# Patient Record
Sex: MALE | Race: WHITE | Employment: UNEMPLOYED | URBAN - METROPOLITAN AREA
[De-identification: names, ages, dates, MRNs, and addresses within clinical notes are randomized per-mention and may not be internally consistent; named-entity substitution may affect disease eponyms.]

---

## 2017-05-09 ENCOUNTER — HOSPITAL ENCOUNTER (EMERGENCY)
Age: 32
Discharge: HOME | End: 2017-05-09
Attending: INTERNAL MEDICINE
Payer: MEDICAID

## 2017-05-09 VITALS
OXYGEN SATURATION: 95 % | WEIGHT: 225.25 LBS | BODY MASS INDEX: 32.25 KG/M2 | TEMPERATURE: 97.6 F | HEIGHT: 70 IN | DIASTOLIC BLOOD PRESSURE: 49 MMHG | HEART RATE: 92 BPM | RESPIRATION RATE: 17 BRPM | SYSTOLIC BLOOD PRESSURE: 100 MMHG

## 2017-05-09 DIAGNOSIS — G40.909 SEIZURE DISORDER (HCC): Primary | ICD-10-CM

## 2017-05-09 DIAGNOSIS — F19.10 DRUG ABUSE (HCC): ICD-10-CM

## 2017-05-09 LAB
ALBUMIN SERPL BCP-MCNC: 4 G/DL (ref 3.5–5)
ALBUMIN/GLOB SERPL: 1.3 {RATIO} (ref 1.1–2.2)
ALP SERPL-CCNC: 88 U/L (ref 45–117)
ALT SERPL-CCNC: 20 U/L (ref 12–78)
AMPHET UR QL SCN: NEGATIVE
ANION GAP BLD CALC-SCNC: 23 MMOL/L (ref 5–15)
AST SERPL W P-5'-P-CCNC: 22 U/L (ref 15–37)
BARBITURATES UR QL SCN: NEGATIVE
BASOPHILS # BLD AUTO: 0.1 K/UL (ref 0–0.1)
BASOPHILS # BLD: 1 % (ref 0–1)
BENZODIAZ UR QL: NEGATIVE
BILIRUB DIRECT SERPL-MCNC: 0.2 MG/DL (ref 0–0.2)
BILIRUB SERPL-MCNC: 0.9 MG/DL (ref 0.2–1)
BUN BLD-MCNC: 20 MG/DL (ref 9–20)
CA-I BLD-MCNC: 1.11 MMOL/L (ref 1.12–1.32)
CANNABINOIDS UR QL SCN: NEGATIVE
CHLORIDE BLD-SCNC: 102 MMOL/L (ref 98–107)
CO2 BLD-SCNC: 22 MMOL/L (ref 21–32)
COCAINE UR QL SCN: POSITIVE
CREAT BLD-MCNC: 1.1 MG/DL (ref 0.6–1.3)
DRUG SCRN COMMENT,DRGCM: ABNORMAL
EOSINOPHIL # BLD: 0.1 K/UL (ref 0–0.4)
EOSINOPHIL NFR BLD: 1 % (ref 0–7)
ERYTHROCYTE [DISTWIDTH] IN BLOOD BY AUTOMATED COUNT: 13.3 % (ref 11.5–14.5)
ETHANOL SERPL-MCNC: <10 MG/DL
GLOBULIN SER CALC-MCNC: 3.2 G/DL (ref 2–4)
GLUCOSE BLD-MCNC: 90 MG/DL (ref 65–100)
HCT VFR BLD AUTO: 41.7 % (ref 36.6–50.3)
HCT VFR BLD CALC: 46 % (ref 36.6–50.3)
HGB BLD-MCNC: 15.1 G/DL (ref 12.1–17)
HGB BLD-MCNC: 15.6 GM/DL (ref 12.1–17)
LYMPHOCYTES # BLD AUTO: 21 % (ref 12–49)
LYMPHOCYTES # BLD: 2.3 K/UL (ref 0.8–3.5)
MCH RBC QN AUTO: 32.6 PG (ref 26–34)
MCHC RBC AUTO-ENTMCNC: 36.2 G/DL (ref 30–36.5)
MCV RBC AUTO: 90.1 FL (ref 80–99)
METHADONE UR QL: NEGATIVE
MONOCYTES # BLD: 0.8 K/UL (ref 0–1)
MONOCYTES NFR BLD AUTO: 7 % (ref 5–13)
NEUTS SEG # BLD: 7.8 K/UL (ref 1.8–8)
NEUTS SEG NFR BLD AUTO: 70 % (ref 32–75)
OPIATES UR QL: NEGATIVE
PCP UR QL: NEGATIVE
PLATELET # BLD AUTO: 260 K/UL (ref 150–400)
POTASSIUM BLD-SCNC: 3.3 MMOL/L (ref 3.5–5.1)
PROT SERPL-MCNC: 7.2 G/DL (ref 6.4–8.2)
RBC # BLD AUTO: 4.63 M/UL (ref 4.1–5.7)
SERVICE CMNT-IMP: ABNORMAL
SODIUM BLD-SCNC: 143 MMOL/L (ref 136–145)
WBC # BLD AUTO: 11.1 K/UL (ref 4.1–11.1)

## 2017-05-09 PROCEDURE — 99285 EMERGENCY DEPT VISIT HI MDM: CPT

## 2017-05-09 PROCEDURE — 80076 HEPATIC FUNCTION PANEL: CPT | Performed by: INTERNAL MEDICINE

## 2017-05-09 PROCEDURE — 80183 DRUG SCRN QUANT OXCARBAZEPIN: CPT | Performed by: INTERNAL MEDICINE

## 2017-05-09 PROCEDURE — 80307 DRUG TEST PRSMV CHEM ANLYZR: CPT | Performed by: INTERNAL MEDICINE

## 2017-05-09 PROCEDURE — 93005 ELECTROCARDIOGRAM TRACING: CPT

## 2017-05-09 PROCEDURE — 74011250637 HC RX REV CODE- 250/637: Performed by: INTERNAL MEDICINE

## 2017-05-09 PROCEDURE — 85025 COMPLETE CBC W/AUTO DIFF WBC: CPT | Performed by: INTERNAL MEDICINE

## 2017-05-09 PROCEDURE — 80047 BASIC METABLC PNL IONIZED CA: CPT

## 2017-05-09 PROCEDURE — 36415 COLL VENOUS BLD VENIPUNCTURE: CPT | Performed by: INTERNAL MEDICINE

## 2017-05-09 RX ORDER — SODIUM CHLORIDE 0.9 % (FLUSH) 0.9 %
5-10 SYRINGE (ML) INJECTION AS NEEDED
Status: DISCONTINUED | OUTPATIENT
Start: 2017-05-09 | End: 2017-05-10 | Stop reason: HOSPADM

## 2017-05-09 RX ORDER — POTASSIUM CHLORIDE 750 MG/1
10 TABLET, FILM COATED, EXTENDED RELEASE ORAL
Status: COMPLETED | OUTPATIENT
Start: 2017-05-09 | End: 2017-05-09

## 2017-05-09 RX ORDER — SODIUM CHLORIDE 0.9 % (FLUSH) 0.9 %
5-10 SYRINGE (ML) INJECTION EVERY 8 HOURS
Status: DISCONTINUED | OUTPATIENT
Start: 2017-05-09 | End: 2017-05-10 | Stop reason: HOSPADM

## 2017-05-09 RX ADMIN — POTASSIUM CHLORIDE 10 MEQ: 750 TABLET, FILM COATED, EXTENDED RELEASE ORAL at 22:00

## 2017-05-09 NOTE — ED PROVIDER NOTES
HPI Comments: Venessa Cai is a 34 y.o. male with PMHx of epilepsy / arthritis / depression who presents via EMS to the ED for treatment of acute onset of seizure-like symptoms just PTA. 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 EMS personnel notes that the pt was not actively seizing when he was found by his co-worker or when EMS personnel arrived on scene. Per EMS personnel, pt was in a post-ictal state upon arrival although they note that his condition has improved since. EMS personnel also notes that he was found with seizure medications on his person. Pt is responsive upon arrival to the ED. He notes that he is compliant with his prescribed Trileptal for his seizures but denies taking any other ant-seizure medications. Pt denies any memory of the events that took place between working on the carpet and being woken up by his co-worker. He notes that it has been \"several months\" since he last had a seizure. Pt specifically denies biting his tongue or urinating during the incident, missed meals, lost sleep, or recent trauma that would have caused his symptoms. Social hx: + Tobacco use (0.25 ppd), - EtOH use, - Illicit drug use    PCP: Erick Aparicio III, DO    There are no other complaints, changes or physical findings at this time. The history is provided by the patient and the EMS personnel. No  was used.         Past Medical History:   Diagnosis Date    Anxiety disorder     Arthritis     Rheumatoid     Depression     Bipolar    Unspecified epilepsy without mention of intractable epilepsy ([facility] [address]     has epilepsy since age 6 mo; most recent 4mo ago       Past Surgical History:   Procedure Laterality Date    HX APPENDECTOMY      as child    HX ORTHOPAEDIC Right     Right hand fracture surgically repaired/Right foot surgery    HX ORTHOPAEDIC heel spurs         Family History:   Problem Relation Age of Onset    Cancer Mother      breast    No Known Problems Father        Social History     Social History    Marital status: SINGLE     Spouse name: N/A    Number of children: N/A    Years of education: N/A     Occupational History    Not on file. Social History Main Topics    Smoking status: Current Every Day Smoker     Packs/day: 0.25    Smokeless tobacco: Never Used    Alcohol use No    Drug use: Yes     Special: Marijuana      Comment: in past, but not currently    Sexual activity: Not on file     Other Topics Concern    Not on file     Social History Narrative         ALLERGIES: Review of patient's allergies indicates no known allergies. Review of Systems   Constitutional: Negative. Negative for chills and fever.        - loss of sleep  - missed meals   HENT: Negative.         + \"foaming at the mouth\"   Respiratory: Negative. Negative for cough and shortness of breath. Cardiovascular: Negative. Negative for chest pain. Gastrointestinal: Negative. Negative for abdominal pain, diarrhea, nausea and vomiting. Genitourinary: Negative. Negative for difficulty urinating. Skin: Negative. Negative for rash. Neurological: Positive for syncope.        + \"post-ictal state\"     Patient Vitals for the past 12 hrs:   Temp Pulse Resp BP SpO2   05/09/17 2100 - 92 17 100/49 95 %   05/09/17 2030 - 93 19 101/51 97 %   05/09/17 1943 97.6 °F (36.4 °C) (!) 106 16 125/70 93 %       Physical Exam   Constitutional: He is oriented to person, place, and time. He appears well-developed and well-nourished. No distress. HENT:   Head: Normocephalic and atraumatic. Eyes: EOM are normal. Pupils are equal, round, and reactive to light. Neck: Normal range of motion. Neck supple. Cardiovascular: Normal rate, normal heart sounds and intact distal pulses. Pulmonary/Chest: Effort normal and breath sounds normal. No respiratory distress. Abdominal: Soft. Bowel sounds are normal. He exhibits no distension. There is no tenderness. Musculoskeletal: Normal range of motion. He exhibits no edema or tenderness. Neurological: He is alert and oriented to person, place, and time. Skin: Skin is warm and dry. No rash noted. Psychiatric: He has a normal mood and affect. His behavior is normal.   Nursing note and vitals reviewed. MDM  Number of Diagnoses or Management Options  Drug abuse:   Seizure disorder [facility]):   Diagnosis management comments: DDx: seizure disorder, metabolic disorder       Amount and/or Complexity of Data Reviewed  Clinical lab tests: ordered and reviewed  Tests in the medicine section of CPT®: ordered and reviewed  Obtain [facility] someone other than the patient[facility]  Review and summarize past medical records: yes  Independent visualization of images, tracings, or specimens: yes      ED Course       Procedures    7:30 PM  I have reviewed medical records in the EHR, pertinent to patients present condition/presenting problems. In April of 2016, pt had a CT scan of his head performed that showed no intracranial disease. Pt had levels of Trileptal and Valproic acid performed at that time. Written by SAHRA Campbell, as dictated by Andreina Trinidad MD.    Progress Note:  7:39 PM  Discussed the risks of smoking and the benefits of smoking cessation as well as the long term sequelae of smoking with the pt. The patient verbalized their understanding. Written by SAHRA Campbell, as dictated by Andreina Trinidad MD.    EKG interpretation: (Preliminary)  7:41 PM  Rhythm: normal sinus rhythm; and regular . Rate (approx.): 92; Axis: normal; MO interval: normal; QRS interval: normal ; ST/T wave: normal; Other findings: normal.    Progress Note:  9:36 PM  Labs and imaging results were reviewed. It was determined that the pt tested positive for cocaine. These results were shared with the pt.    Written by SAHRA Bashiribtammy, as dictated by Maulik Ziegler MD.    LABORATORY TESTS:  Recent Results (from the past 12 hour(s))   EKG, 12 LEAD, INITIAL    Collection Time: 05/09/17  7:48 PM   Result Value Ref Range    Ventricular Rate 92 BPM    Atrial Rate 92 BPM    P-R Interval 144 ms    QRS Duration 92 ms    Q-T Interval 376 ms    QTC Calculation (Bezet) 464 ms    Calculated P Axis 27 degrees    Calculated R Axis 33 degrees    Calculated T Axis 24 degrees    Diagnosis       Normal sinus rhythm  Normal ECG  No previous ECGs available     CBC WITH AUTOMATED DIFF    Collection Time: 05/09/17  8:05 PM   Result Value Ref Range    WBC 11.1 4.1 - 11.1 K/uL    RBC 4.63 4.10 - 5.70 M/uL    HGB 15.1 12.1 - 17.0 g/dL    HCT 41.7 36.6 - 50.3 %    MCV 90.1 80.0 - 99.0 FL    MCH 32.6 26.0 - 34.0 PG    MCHC 36.2 30.0 - 36.5 g/dL    RDW 13.3 11.5 - 14.5 %    PLATELET 408 101 - 787 K/uL    NEUTROPHILS 70 32 - 75 %    LYMPHOCYTES 21 12 - 49 %    MONOCYTES 7 5 - 13 %    EOSINOPHILS 1 0 - 7 %    BASOPHILS 1 0 - 1 %    ABS. NEUTROPHILS 7.8 1.8 - 8.0 K/UL    ABS. LYMPHOCYTES 2.3 0.8 - 3.5 K/UL    ABS. MONOCYTES 0.8 0.0 - 1.0 K/UL    ABS. EOSINOPHILS 0.1 0.0 - 0.4 K/UL    ABS.  BASOPHILS 0.1 0.0 - 0.1 K/UL   DRUG SCREEN, URINE    Collection Time: 05/09/17  8:05 PM   Result Value Ref Range    AMPHETAMINE NEGATIVE  NEG      BARBITURATES NEGATIVE  NEG      BENZODIAZEPINE NEGATIVE  NEG      COCAINE POSITIVE (A) NEG      METHADONE NEGATIVE  NEG      OPIATES NEGATIVE  NEG      PCP(PHENCYCLIDINE) NEGATIVE  NEG      THC (TH-CANNABINOL) NEGATIVE  NEG      Drug screen comment (NOTE)    ETHYL ALCOHOL    Collection Time: 05/09/17  8:05 PM   Result Value Ref Range    ALCOHOL(ETHYL),SERUM <10 <10 MG/DL   HEPATIC FUNCTION PANEL    Collection Time: 05/09/17  8:05 PM   Result Value Ref Range    Protein, total 7.2 6.4 - 8.2 g/dL    Albumin 4.0 3.5 - 5.0 g/dL    Globulin 3.2 2.0 - 4.0 g/dL    A-G Ratio 1.3 1.1 - 2.2      Bilirubin, total 0.9 0.2 - 1.0 MG/DL    Bilirubin, direct 0.2 0.0 - 0.2 MG/DL    Alk. phosphatase 88 45 - 117 U/L    AST (SGOT) 22 15 - 37 U/L    ALT (SGPT) 20 12 - 78 U/L   POC CHEM8    Collection Time: 05/09/17  8:09 PM   Result Value Ref Range    Calcium, ionized (POC) 1.11 (L) 1.12 - 1.32 MMOL/L    Sodium (POC) 143 136 - 145 MMOL/L    Potassium (POC) 3.3 (L) 3.5 - 5.1 MMOL/L    Chloride (POC) 102 98 - 107 MMOL/L    CO2 (POC) 22 21 - 32 MMOL/L    Anion gap (POC) 23 (H) 5 - 15 mmol/L    Glucose (POC) 90 65 - 100 MG/DL    BUN (POC) 20 9 - 20 MG/DL    Creatinine (POC) 1.1 0.6 - 1.3 MG/DL    GFR-AA (POC) >60 >60 ml/min/1.73m2    GFR, non-AA (POC) >60 >60 ml/min/1.73m2    Hemoglobin (POC) 15.6 12.1 - 17.0 GM/DL    Hematocrit (POC) 46 36.6 - 50.3 %    Comment Comment Not Indicated. MEDICATIONS GIVEN:  Medications   sodium chloride (NS) flush 5-10 mL (not administered)   sodium chloride (NS) flush 5-10 mL (not administered)   potassium chloride SR (KLOR-CON 10) tablet 10 mEq (not administered)       IMPRESSION:  1. Seizure disorder ([facility] [address]        PLAN:  1. Current Discharge Medication List        2. Follow-up Information     None        Return to ED if worse     DISCHARGE NOTE  9:38 PM  The patient has been re-evaluated and is ready for discharge. Reviewed available results with patient. Counseled pt on diagnosis and care plan. Pt has expressed understanding, and all questions have been answered. Pt agrees with plan and agrees to F/U as recommended, or return to the ED if their sxs worsen. Discharge instructions have been provided and explained to the pt, along with reasons to return to the ED. This note is prepared by Meseret Sahu, acting as Scribe for Jaclyn Salguero MD.    Jaclyn Salguero MD: The scribe's documentation has been prepared under my direction and personally reviewed by me in its entirety. I confirm that the note above accurately reflects all work, treatment, procedures, and medical decision making performed by me.

## 2017-05-10 NOTE — ED NOTES
Discharge instructions were given to the patient by Felicity Jeffery RN. The patient left the Emergency Department ambulatory, alert and oriented and in no acute distress with 0 prescriptions. The patient was encouraged to call or return to the ED for worsening issues or problems and was encouraged to schedule a follow up appointment for continuing care. The patient verbalized understanding of discharge instructions and prescriptions, all questions were answered. The patient has no further concerns at this time.

## 2017-05-10 NOTE — DISCHARGE INSTRUCTIONS
Epilepsy: Care Instructions  Your Care Instructions  Epilepsy is a common condition that causes repeated seizures. The seizures are caused by bursts of electrical activity in the brain that aren't normal. Seizures may cause problems with muscle control, movement, speech, vision, or awareness. They can be scary. Epilepsy affects each person differently. Some people have only a few seizures. Others get them more often. If you know what triggers a seizure, you may be able to avoid having one. You can take medicines to control and reduce seizures. You and your doctor will need to find the right combination, schedule, and dose of medicine. This may take time and careful changes. Seizures may get worse and happen more often over time. Follow-up care is a key part of your treatment and safety. Be sure to make and go to all appointments, and call your doctor if you are having problems. It's also a good idea to know your test results and keep a list of the medicines you take. How can you care for yourself at home? · Be safe with medicines. Take your medicines exactly as prescribed. Call your doctor if you think you are having a problem with your medicine. · Make a treatment plan with your doctor. Be sure to follow your plan. · Try to identify and avoid things that may make you more likely to have a seizure. These may include:  ¨ Not getting enough sleep. ¨ Using drugs or alcohol. ¨ Being emotionally stressed. ¨ Skipping meals. · Keep a record of any seizures you have. Note the date, time of day, and any details about the seizure that you can remember. Your doctor can use this information to plan or adjust your medicine or other treatment. · Be sure that any doctor treating you for another condition knows that you have epilepsy. Each doctor should know what medicines you are taking, if any. · Wear a medical ID bracelet. You can buy this at most [employer].  If you have a seizure that leaves you unconscious or unable to speak for yourself, this bracelet will let those who are treating you know that you have epilepsy. · Talk to your doctor about whether it is safe for you to do certain activities, such as drive or swim. When should you call for help? Call 911 anytime you think you may need emergency care. For example, call if:  · A seizure does not stop as it normally does. · You have new symptoms such as:  ¨ Numbness, tingling, or weakness on one side of your body or face. ¨ Vision changes. ¨ Trouble speaking or thinking clearly. Call your doctor now or seek immediate medical care if:  · You have a fever. · You have a severe headache. Watch closely for changes in your health, and be sure to contact your doctor if:  · The normal pattern or features of your seizures change. Where can you learn more? Go to http://alvinREVENUE.com.info/. Yasmine Gaviria in the search box to learn more about \"Epilepsy: Care Instructions. \"  Current as of: October 14, 2016  Content Version: 11.2  © 7702-8352 [employer]. Care instructions adapted under license by [employer] (which disclaims liability or warranty for this information). If you have questions about a medical condition or this instruction, always ask your healthcare professional. [address] any warranty or liability for your use of this information. Learning About Drug Abuse  What is drug abuse? Drug abuse means using drugs in a way that harms you or causes you to harm others. Your doctor may call it substance use disorder or drug misuse. It's possible to misuse almost any type of drug, including illegal drugs, prescription drugs, and over-the-counter drugs. Could you have a problem? If there's a chance you may be misusing drugs, it's important to find out. So ask yourself a few questions. · Do you spend a lot of time thinking about your medicine or other drug--getting it and using it?  Has that taken the place of other things you used to enjoy? · Have you had problems with your family or friends, or at work, because of your use? · Do you use drugs even when you've told yourself you won't? Do you think you might have a problem? If you do, then you've just taken an important first step. Many people have overcome this problem. And most of them started by reaching out to others, like caring friends or family, their doctor, or a support group. How is drug abuse treated? If you think you may have a problem with drugs, talk to your doctor. You and your doctor can decide whether you have a problem and what type of treatment might help you. You may need to stay in a hospital at first, so that you can be treated for withdrawal symptoms. One of the goals of treatment is helping you get used to life without the drug. Counseling can help you prepare for people or situations that might tempt you to start using again. You can practice these skills through one-on-one counseling, family therapy, or group therapy. Therapy may be part of inpatient treatment, where you stay in a treatment center. Or it may be part of outpatient treatment, where you can fit your therapy around your job or other responsibilities. Another goal of treatment is getting ongoing support for your drug-free life. Many people find support by going to meetings like Narcotics Anonymous or SMART Recovery. This type of support can help you feel less alone and more motivated to stay drug-free. You might talk to your doctor or do an online search for local treatment programs. Or you might tell a friend or loved one that you need help. Follow-up care is a key part of your treatment and safety. Be sure to make and go to all appointments, and call your doctor if you are having problems. It's also a good idea to know your test results and keep a list of the medicines you take. Where can you learn more?   Go to http://marie.info/. Enter 432-969-5434 in the search box to learn more about \"Learning About Drug Abuse. \"  Current as of: November 3, 2016  Content Version: 11.2  © 8673-1662 [employer]. Care instructions adapted under license by [employer] (which disclaims liability or warranty for this information). If you have questions about a medical condition or this instruction, always ask your healthcare professional. [place] [address] any warranty or liability for your use of this information.

## 2017-05-10 NOTE — ED NOTES
Pt presents to ED via EMS complaining of witnessed seizure while at work Pt reports his last seizure was approx 2 months ago. Pt reports he does not know if he lost consciousness or hit his head during his seizure. Pt reports being compliant with all medications, has a bag full of loose pulls in a small bag. Family at bedside. Pt is alert and oriented x 4, RR even and unlabored, skin is warm and dry. Assesment completed and pt updated on plan of care. Emergency Department Nursing Plan of Care       The Nursing Plan of Care is developed from the Nursing assessment and Emergency Department Attending provider initial evaluation. The plan of care may be reviewed in the ED Provider note.     The Plan of Care was developed with the following considerations:   Patient / Family readiness to learn indicated by:verbalized understanding  Persons(s) to be included in education: patient and family  Barriers to Learning/Limitations:No    Signed     Elsy Ivan RN    5/9/2017   8:35 PM

## 2017-05-12 LAB — OXCARBAZEPINE SERPL-MCNC: 4 UG/ML (ref 10–35)

## 2017-05-14 LAB
ATRIAL RATE: 92 BPM
CALCULATED P AXIS, ECG09: 27 DEGREES
CALCULATED R AXIS, ECG10: 33 DEGREES
CALCULATED T AXIS, ECG11: 24 DEGREES
DIAGNOSIS, 93000: NORMAL
P-R INTERVAL, ECG05: 144 MS
Q-T INTERVAL, ECG07: 376 MS
QRS DURATION, ECG06: 92 MS
QTC CALCULATION (BEZET), ECG08: 464 MS
VENTRICULAR RATE, ECG03: 92 BPM

## 2017-06-12 ENCOUNTER — TELEPHONE (OUTPATIENT)
Dept: NEUROLOGY | Age: 32
End: 2017-06-12

## 2017-06-19 RX ORDER — NABUMETONE 500 MG/1
TABLET, FILM COATED ORAL
Qty: 60 TAB | Refills: 0 | Status: SHIPPED | OUTPATIENT
Start: 2017-06-19 | End: 2017-07-16 | Stop reason: SDUPTHER

## 2017-06-20 NOTE — TELEPHONE ENCOUNTER
Patient was given an appointment for October 31 and was told he could not drive for 6 months until November 9.

## 2017-07-16 RX ORDER — NABUMETONE 500 MG/1
TABLET, FILM COATED ORAL
Qty: 60 TAB | Refills: 0 | Status: SHIPPED | OUTPATIENT
Start: 2017-07-16 | End: 2017-08-14 | Stop reason: SDUPTHER

## 2017-08-14 RX ORDER — NABUMETONE 500 MG/1
TABLET, FILM COATED ORAL
Qty: 60 TAB | Refills: 0 | Status: SHIPPED | OUTPATIENT
Start: 2017-08-14 | End: 2017-09-11 | Stop reason: SDUPTHER

## 2017-09-12 RX ORDER — NABUMETONE 500 MG/1
TABLET, FILM COATED ORAL
Qty: 60 TAB | Refills: 0 | Status: SHIPPED | OUTPATIENT
Start: 2017-09-12 | End: 2017-10-11 | Stop reason: SDUPTHER

## 2017-10-12 RX ORDER — NABUMETONE 500 MG/1
TABLET, FILM COATED ORAL
Qty: 60 TAB | Refills: 0 | Status: SHIPPED | OUTPATIENT
Start: 2017-10-12 | End: 2018-01-03 | Stop reason: SDUPTHER

## 2017-10-12 RX ORDER — FLUOXETINE HYDROCHLORIDE 40 MG/1
CAPSULE ORAL
Qty: 30 CAP | Refills: 0 | Status: SHIPPED | OUTPATIENT
Start: 2017-10-12 | End: 2020-02-21 | Stop reason: ALTCHOICE

## 2017-10-31 ENCOUNTER — OFFICE VISIT (OUTPATIENT)
Dept: NEUROLOGY | Age: 32
End: 2017-10-31

## 2017-10-31 VITALS
HEIGHT: 70 IN | WEIGHT: 230 LBS | BODY MASS INDEX: 32.93 KG/M2 | HEART RATE: 74 BPM | DIASTOLIC BLOOD PRESSURE: 74 MMHG | OXYGEN SATURATION: 98 % | SYSTOLIC BLOOD PRESSURE: 100 MMHG

## 2017-10-31 DIAGNOSIS — Z51.81 THERAPEUTIC DRUG MONITORING: ICD-10-CM

## 2017-10-31 DIAGNOSIS — G40.209 COMPLEX PARTIAL SEIZURES WITH CONSCIOUSNESS IMPAIRED (HCC): Primary | ICD-10-CM

## 2017-10-31 NOTE — PATIENT INSTRUCTIONS
[address] [facility]   Statement to Patients  April 1, 2014      In an effort to ensure the large volume of patient prescription refills is processed in the most efficient and expeditious manner, we are asking our patients to assist us by calling your Pharmacy for all prescription refills, this will include also your  Mail Order Pharmacy. The pharmacy will contact our office electronically to continue the refill process. Please do not wait until the last minute to call your pharmacy. We need at least 48 hours (2days) to fill prescriptions. We also encourage you to call your pharmacy before going to  your prescription to make sure it is ready. With regard to controlled substance prescription refill requests (narcotic refills) that need to be picked up at our office, we ask your cooperation by providing us with at least 72 hours (3days) notice that you will need a refill. We will not refill narcotic prescription refill requests after 4:00pm on any weekday, Monday through Thursday, or after 2:00pm on Fridays, or on the weekends. We encourage everyone to explore another way of getting your prescription refill request processed using [employer], our patient web portal through our electronic medical record system. [employer]PLAY is an efficient and effective way to communicate your medication request directly to the office and  downloadable as an lacho on your smart phone . [employer]PLAY also features a review functionality that allows you to view your medication list as well as leave messages for your physician. Are you ready to get connected? If so please review the attatched instructions or speak to any of our staff to get you set up right away! Thank you so much for your cooperation. Should you have any questions please contact our Practice Administrator.     The Physicians and Staff,  [facility]          A Healthy Lifestyle: Care Instructions  Your Care Instructions    A healthy lifestyle can help you feel good, stay at a healthy weight, and have plenty of energy for both work and play. A healthy lifestyle is something you can share with your whole family. A healthy lifestyle also can lower your risk for serious health problems, such as high blood pressure, heart disease, and diabetes. You can follow a few steps listed below to improve your health and the health of your family. Follow-up care is a key part of your treatment and safety. Be sure to make and go to all appointments, and call your doctor if you are having problems. It's also a good idea to know your test results and keep a list of the medicines you take. How can you care for yourself at home? · Do not eat too much sugar, fat, or fast foods. You can still have dessert and treats now and then. The goal is moderation. · Start small to improve your eating habits. Pay attention to portion sizes, drink less juice and soda pop, and eat more fruits and vegetables. ¨ Eat a healthy amount of food. A 3-ounce serving of meat, for example, is about the size of a deck of cards. Fill the rest of your plate with vegetables and whole grains. ¨ Limit the amount of soda and sports drinks you have every day. Drink more water when you are thirsty. ¨ Eat at least 5 servings of fruits and vegetables every day. It may seem like a lot, but it is not hard to reach this goal. A serving or helping is 1 piece of fruit, 1 cup of vegetables, or 2 cups of leafy, raw vegetables. Have an apple or some carrot sticks as an afternoon snack instead of a candy bar. Try to have fruits and/or vegetables at every meal.  · Make exercise part of your daily routine. You may want to start with simple activities, such as walking, bicycling, or slow swimming. Try to be active 30 to 60 minutes every day. You do not need to do all 30 to 60 minutes all at once. For example, you can exercise 3 times a day for 10 or 20 minutes.  Moderate exercise is safe for most people, but it is always a good idea to talk to your doctor before starting an exercise program.  · Keep moving. [place] the lawn, work in the garden, or [employer]. Take the stairs instead of the elevator at work. · If you smoke, quit. People who smoke have an increased risk for heart attack, stroke, cancer, and other lung illnesses. Quitting is hard, but there are ways to boost your chance of quitting tobacco for good. ¨ Use nicotine gum, patches, or lozenges. ¨ Ask your doctor about stop-smoking programs and medicines. ¨ Keep trying. In addition to reducing your risk of diseases in the future, you will notice some benefits soon after you stop using tobacco. If you have shortness of breath or asthma symptoms, they will likely get better within a few weeks after you quit. · Limit how much alcohol you drink. Moderate amounts of alcohol (up to 2 drinks a day for men, 1 drink a day for women) are okay. But drinking too much can lead to liver problems, high blood pressure, and other health problems. Family health  If you have a family, there are many things you can do together to improve your health. · Eat meals together as a family as often as possible. · Eat healthy foods. This includes fruits, vegetables, lean meats and dairy, and whole grains. · Include your family in your fitness plan. Most people think of activities such as jogging or tennis as the way to fitness, but there are many ways you and your family can be more active. Anything that makes you breathe hard and gets your heart pumping is exercise. Here are some tips:  ¨ Walk to do errands or to take your child to school or the bus. ¨ Go for a family bike ride after dinner instead of watching TV. Where can you learn more? Go to http://alvin-lizette.info/. Enter Y471 in the search box to learn more about \"A Healthy Lifestyle: Care Instructions. \"  Current as of:  May 12, 2017  Content Version: 11.4  © 1532-1178 [employer]. Care instructions adapted under license by [employer] (which disclaims liability or warranty for this information). If you have questions about a medical condition or this instruction, always ask your healthcare professional. [place] [address] any warranty or liability for your use of this information.

## 2017-10-31 NOTE — MR AVS SNAPSHOT
Visit Information Date & Time Provider Department Dept. Phone Encounter #  
 10/31/2017 11:00 AM Tammy Velez NP Neurology Clinic at [facility] [phone] [id number] Upcoming Health Maintenance Date Due Pneumococcal 19-64 Medium Risk (1 of 1 - PPSV23) 4/11/2003 INFLUENZA AGE 9 TO ADULT 8/1/2017 DTaP/Tdap/Td series (2 - Td) 4/17/2026 Allergies as of 10/31/2017  Review Complete On: 10/31/2017 By: Juancho Barsher LPN No Known Allergies Current Immunizations  Never Reviewed Name Date Tdap 4/17/2016  1:57 AM  
  
 Not reviewed this visit You Were Diagnosed With   
  
 Codes Comments Complex partial seizures with consciousness impaired ([facility] [address]    -  Primary ICD-10-CM: K33.921 ICD-9-CM: 345.40 Therapeutic drug monitoring     ICD-10-CM: Z51.81 
ICD-9-CM: V58.83 Vitals BP Pulse Height(growth percentile) Weight(growth percentile) SpO2 BMI  
 100/74 74 5' 10\" (1.778 m) 230 lb (104.3 kg) 98% 33 kg/m2 Smoking Status Current Every Day Smoker Vitals History BMI and BSA Data Body Mass Index Body Surface Area  
 33 kg/m 2 2.27 m 2 Preferred Pharmacy Pharmacy Name Phone [facility] [address] [phone] Your Updated Medication List  
  
   
This list is accurate as of: 10/31/17 11:26 AM.  Always use your most recent med list.  
  
  
  
  
 B COMPLEX 1 tablet Generic drug:  b complex vitamins Take 1 Tab by mouth daily. FLUoxetine 40 mg capsule Commonly known as:  PROzac TAKE 1 CAPSULE BY MOUTH DAILY  
  
 nabumetone 500 mg tablet Commonly known as:  RELAFEN  
TAKE 1 TABLET BY MOUTH TWICE DAILY AS NEEDED FOR PAIN OXcarbaxepine 600 mg tablet Commonly known as:  TRILEPTAL  
TAKE 1 TABLET BY MOUTH TWICE DAILY  
  
 VITAMIN D3 1,000 unit Cap Generic drug:  cholecalciferol Take  by mouth. We Performed the Following OXCARBAZEPINE(TRILEPTAL) [id number] CPT(R)] Patient Instructions PRESCRIPTION REFILL POLICY [facility] Statement to Patients April 1, 2014 In an effort to ensure the large volume of patient prescription refills is processed in the most efficient and expeditious manner, we are asking our patients to assist us by calling your Pharmacy for all prescription refills, this will include also your  Mail Order Pharmacy. The pharmacy will contact our office electronically to continue the refill process. Please do not wait until the last minute to call your pharmacy. We need at least 48 hours (2days) to fill prescriptions. We also encourage you to call your pharmacy before going to  your prescription to make sure it is ready. With regard to controlled substance prescription refill requests (narcotic refills) that need to be picked up at our office, we ask your cooperation by providing us with at least 72 hours (3days) notice that you will need a refill. We will not refill narcotic prescription refill requests after 4:00pm on any weekday, Monday through Thursday, or after 2:00pm on Fridays, or on the weekends. We encourage everyone to explore another way of getting your prescription refill request processed using Mang?rKart, our patient web portal through our electronic medical record system. Mang?rKart is an efficient and effective way to communicate your medication request directly to the office and  downloadable as an lacho on your smart phone . Mang?rKart also features a review functionality that allows you to view your medication list as well as leave messages for your physician. Are you ready to get connected? If so please review the attatched instructions or speak to any of our staff to get you set up right away! Thank you so much for your cooperation.  Should you have any questions please contact our Practice Administrator. The Physicians and Staff,  [address] [place] [address] Neurology Clinic A Healthy Lifestyle: Care Instructions Your Care Instructions A healthy lifestyle can help you feel good, stay at a healthy weight, and have plenty of energy for both work and play. A healthy lifestyle is something you can share with your whole family. A healthy lifestyle also can lower your risk for serious health problems, such as high blood pressure, heart disease, and diabetes. You can follow a few steps listed below to improve your health and the health of your family. Follow-up care is a key part of your treatment and safety. Be sure to make and go to all appointments, and call your doctor if you are having problems. It's also a good idea to know your test results and keep a list of the medicines you take. How can you care for yourself at home? · Do not eat too much sugar, fat, or fast foods. You can still have dessert and treats now and then. The goal is moderation. · Start small to improve your eating habits. Pay attention to portion sizes, drink less juice and soda pop, and eat more fruits and vegetables. ¨ Eat a healthy amount of food. A 3-ounce serving of meat, for example, is about the size of a deck of cards. Fill the rest of your plate with vegetables and whole grains. ¨ Limit the amount of soda and sports drinks you have every day. Drink more water when you are thirsty. ¨ Eat at least 5 servings of fruits and vegetables every day. It may seem like a lot, but it is not hard to reach this goal. A serving or helping is 1 piece of fruit, 1 cup of vegetables, or 2 cups of leafy, raw vegetables. Have an apple or some carrot sticks as an afternoon snack instead of a candy bar. Try to have fruits and/or vegetables at every meal. 
· Make exercise part of your daily routine. You may want to start with simple activities, such as walking, bicycling, or slow swimming.  Try to be active 30 to 60 minutes every day. You do not need to do all 30 to 60 minutes all at once. For example, you can exercise 3 times a day for 10 or 20 minutes. Moderate exercise is safe for most people, but it is always a good idea to talk to your doctor before starting an exercise program. 
· Keep moving. Isael Sang the lawn, work in the garden, or [employer]. Take the stairs instead of the elevator at work. · If you smoke, quit. People who smoke have an increased risk for heart attack, stroke, cancer, and other lung illnesses. Quitting is hard, but there are ways to boost your chance of quitting tobacco for good. ¨ Use nicotine gum, patches, or lozenges. ¨ Ask your doctor about stop-smoking programs and medicines. ¨ Keep trying. In addition to reducing your risk of diseases in the future, you will notice some benefits soon after you stop using tobacco. If you have shortness of breath or asthma symptoms, they will likely get better within a few weeks after you quit. · Limit how much alcohol you drink. Moderate amounts of alcohol (up to 2 drinks a day for men, 1 drink a day for women) are okay. But drinking too much can lead to liver problems, high blood pressure, and other health problems. Family health If you have a family, there are many things you can do together to improve your health. · Eat meals together as a family as often as possible. · Eat healthy foods. This includes fruits, vegetables, lean meats and dairy, and whole grains. · Include your family in your fitness plan. Most people think of activities such as jogging or tennis as the way to fitness, but there are many ways you and your family can be more active. Anything that makes you breathe hard and gets your heart pumping is exercise. Here are some tips: 
¨ Walk to do errands or to take your child to school or the bus. ¨ Go for a family bike ride after dinner instead of watching TV. Where can you learn more? Go to http://alvin-lizette.info/. Enter J449 in the search box to learn more about \"A Healthy Lifestyle: Care Instructions. \" Current as of: May 12, 2017 Content Version: 11.4 © 3470-4824 Healthwise, [place]. Care instructions adapted under license by [employer] (which disclaims liability or warranty for this information). If you have questions about a medical condition or this instruction, always ask your healthcare professional. [address] any warranty or liability for your use of this information. Introducing [facility]! Robe Lema introduces [facility] patient portal. Now you can access parts of your medical record, email your doctor's office, and request medication refills online. 1. In your internet browser, go to [email or web address] 2. Click on the First Time User? Click Here link in the Sign In box. You will see the New Member Sign Up page. 3. Enter your [facility] Access Code exactly as it appears below. You will not need to use this code after youve completed the sign-up process. If you do not sign up before the expiration date, you must request a new code. · [facility] Access Code: [email or web address] Expires: 1/29/2018 11:25 AM 
 
4. Enter the last four digits of your Social Security Number (xxxx) and Date of Birth (mm/dd/yyyy) as indicated and click Submit. You will be taken to the next sign-up page. 5. Create a [facility] ID. This will be your [facility] login ID and cannot be changed, so think of one that is secure and easy to remember. 6. Create a [facility] password. You can change your password at any time. 7. Enter your Password Reset Question and Answer. This can be used at a later time if you forget your password. 8. Enter your e-mail address. You will receive e-mail notification when new information is available in [address] 9. Click Sign Up.  You can now view and download portions of your medical record. 10. Click the Download Summary menu link to download a portable copy of your medical information. If you have questions, please visit the Frequently Asked Questions section of the Consumer[employer] website. Remember, ConsumerBell is NOT to be used for urgent needs. For medical emergencies, dial 911. Now available from your iPhone and Android! Please provide this summary of care documentation to your next provider. Your primary care clinician is listed as Nandini Mckay If you have any questions after today's visit, please call [phone].

## 2017-10-31 NOTE — PROGRESS NOTES
Date:           2017    Name:  Akiko Davila  :  1985  MRN:  [id number]     PCP:  Aleksandar Boyer DO    Chief Complaint   Patient presents with    Follow-up    Seizure     Last seizure was Saturday. HISTORY OF PRESENT ILLNESS:  Ludmila Young is a 34 y.o., male who presents today for follow up for seizures. He was on Depakote the past, both for seizures and for bipolar disorder, he did not tolerate that and decided to switch back to Trileptal in May 2016. He has had relatively good control on Trileptal, but has had a few breakthrough seizures. Last seizure was less than a week ago, before that he thinks it may have been in May of this year. He reports that he has been taking medication consistently, is not missing doses of his Trileptal. He was not sick when his breakthrough seizure happened. His urine drug screen was positive for cocaine when he had a seizure in May, negative for alcohol. He has not been sleeping well, he tosses and turns which is not new for him. He is not following with psychiatry consistently but knows that he needs to. His seizures started at 5months of age, no known cause. No h/o head injury or meningitis. He does not drink alcohol, no drug use currently per his report. [id number] recap  Ludmila Young is a 27 y.o., male who presents today for follow up for seizures. He was previously on Depakote for seizures, chose to switch back to Trileptal because of side effects of Depakote in May of this year. He initially went on Depakote for mood stabilization, but did not feel that helped with that. Last reported seizure was in , shortly after changing medications. He was eating, and lost consciousness. Does not remember anything until he was in the emergency room. He is now taking 600 mg bid of trileptal, and hasn't noticed any side effects from that.  He is not currently seeing anyone for his bipolar disorder, feels that it is stable. Takes prozac only for that. Has seen a therapist in the past, not a psychiatrist. Plans to re-establish care with a therapist, does not think that he needs a psychiatrist.  Codi Lobe that he is driving. Knows that he is not supposed to be because he had a seizure in June. Only other complaint is arthritis, for which he takes nambumetone. Current Outpatient Prescriptions   Medication Sig    OXcarbaxepine (TRILEPTAL) 600 mg tablet TAKE 1 TABLET BY MOUTH TWICE DAILY    nabumetone (RELAFEN) 500 mg tablet TAKE 1 TABLET BY MOUTH TWICE DAILY AS NEEDED FOR PAIN    FLUoxetine (PROZAC) 40 mg capsule TAKE 1 CAPSULE BY MOUTH DAILY    Cholecalciferol, Vitamin D3, (VITAMIN D3) 1,000 unit cap Take  by mouth.  b complex vitamins (B COMPLEX 1) tablet Take 1 Tab by mouth daily. No current facility-administered medications for this visit. No Known Allergies  Past Medical History:   Diagnosis Date    Anxiety disorder     Arthritis     Rheumatoid     Depression     Bipolar    Unspecified epilepsy without mention of intractable epilepsy     has epilepsy since age 7 mo; most recent 4mo ago     Past Surgical History:   Procedure Laterality Date    HX APPENDECTOMY      as child    HX ORTHOPAEDIC Right     Right hand fracture surgically repaired/Right foot surgery    HX ORTHOPAEDIC      heel spurs     Social History     Social History    Marital status: SINGLE     Spouse name: N/A    Number of children: N/A    Years of education: N/A     Occupational History    Not on file.      Social History Main Topics    Smoking status: Current Every Day Smoker     Packs/day: 1.00    Smokeless tobacco: Never Used    Alcohol use No    Drug use: Yes     Special: Marijuana      Comment: in past, but not currently    Sexual activity: Not on file     Other Topics Concern    Not on file     Social History Narrative     Family History   Problem Relation Age of Onset    Cancer Mother      breast    No Known Problems Father          PHYSICAL EXAMINATION:    Visit Vitals    /74    Pulse 74    Ht 5' 10\" (1.778 m)    Wt 230 lb (104.3 kg)    SpO2 98%    BMI 33 kg/m2     General:  Well defined, nourished, and groomed individual in no acute distress. Neck: Supple, nontender, no bruits, no pain with resistance to active range of motion. Heart: Regular rate and rhythm, no murmurs, rub, or gallop. Normal S1S2. Lungs:  Clear to auscultation bilaterally with equal chest expansion, no cough, no wheeze  Musculoskeletal:  Extremities revealed no edema and had full range of motion of joints. Psych:  Good mood and bright affect    NEUROLOGICAL EXAMINATION:     Mental Status:   Alert and oriented to person, place, and time with recent and remote memory intact. Attention span and concentration are normal. Speech is fluent with a full fund of knowledge. Cranial Nerves:    II, III, IV, VI:  Visual acuity grossly intact. Extra-ocular movements are full and fluid. No ptosis or nystagmus. V-XII: Hearing is grossly intact. Facial features are symmetric, with normal sensation and strength. The palate rises symmetrically and the tongue protrudes midline. Sternocleidomastoids 5/5. Motor Examination: Normal tone, bulk, and strength, 5/5 muscle strength throughout. Coordination:  Finger to nose testing was normal.   No resting or intention tremor  Gait and Station:  Steady while walking. Normal arm swing. No pronator drift. No muscle wasting or fasciculations noted. ASSESSMENT AND PLAN    ICD-10-CM ICD-9-CM    1. Complex partial seizures with consciousness impaired ([facility]) G40.209 345.40 OXCARBAZEPINE(TRILEPTAL)   2. Therapeutic drug monitoring Z51.81 V58.83 OXCARBAZEPINE(TRILEPTAL)     23-year-old male seen in follow-up for seizures. Recent breakthrough seizure after a few months of good control on Trileptal.  He feels this was unprovoked, no illness or alcohol use.   last breakthrough seizure in May was likely due to cocaine or noncompliance, his urine drug screen was positive and his oxcarbazepine level was low. 1.  Continue Trileptal 600 mg twice daily  2. We will check Trileptal level to see if we can increase the drug, if not will need to add a second medication as he had an unprovoked breakthrough seizure  3. Patient reminded that he cannot drive for 6 months after having a seizure    Follow-up in 6 weeks, call sooner with concerns    Lorena Longoria NP    This note was created using voice recognition software. Despite editing, there may be syntax errors.

## 2017-10-31 NOTE — LETTER
NOTIFICATION RETURN TO WORK / SCHOOL 
 
10/31/2017 11:28 AM 
 
Mr. Darryle Brazil [address] [facility] [id number] To Whom It May Concern: 
 
Darryle Brazil is currently under the care of [address] [place] [address]. He was unable to work on 10/29/2017 due to a medical issue. He will return to work/school on: 10/31/2017 If there are questions or concerns please have the patient contact our office. Sincerely, Sia العلي NP

## 2017-11-09 DIAGNOSIS — G40.209 COMPLEX PARTIAL SEIZURES WITH CONSCIOUSNESS IMPAIRED (HCC): Primary | ICD-10-CM

## 2017-11-09 LAB — OXCARBAZEPINE SERPL-MCNC: 11 UG/ML (ref 10–35)

## 2017-11-09 RX ORDER — OXCARBAZEPINE 150 MG/1
150 TABLET, FILM COATED ORAL 2 TIMES DAILY
Qty: 60 TAB | Refills: 5 | Status: SHIPPED | OUTPATIENT
Start: 2017-11-09 | End: 2020-02-21 | Stop reason: ALTCHOICE

## 2017-11-09 NOTE — PROGRESS NOTES
Please call patient and let him know that I sent in an additional Trileptal (oxcarbazepine) prescription.   He will take one 150 mg tablet twice a day with his 600 mg tablet

## 2017-11-10 ENCOUNTER — TELEPHONE (OUTPATIENT)
Dept: NEUROLOGY | Age: 32
End: 2017-11-10

## 2018-01-04 RX ORDER — NABUMETONE 500 MG/1
TABLET, FILM COATED ORAL
Qty: 60 TAB | Refills: 0 | Status: SHIPPED | OUTPATIENT
Start: 2018-01-04 | End: 2020-02-21 | Stop reason: SDUPTHER

## 2018-01-19 ENCOUNTER — OFFICE VISIT (OUTPATIENT)
Dept: NEUROLOGY | Age: 33
End: 2018-01-19

## 2018-01-19 VITALS
HEIGHT: 70 IN | BODY MASS INDEX: 32.64 KG/M2 | OXYGEN SATURATION: 98 % | WEIGHT: 228 LBS | SYSTOLIC BLOOD PRESSURE: 100 MMHG | HEART RATE: 89 BPM | DIASTOLIC BLOOD PRESSURE: 70 MMHG

## 2018-01-19 DIAGNOSIS — F41.9 ANXIETY: ICD-10-CM

## 2018-01-19 DIAGNOSIS — F60.9 PERSONALITY DISORDER (HCC): ICD-10-CM

## 2018-01-19 DIAGNOSIS — Z51.81 THERAPEUTIC DRUG MONITORING: ICD-10-CM

## 2018-01-19 DIAGNOSIS — G40.209 COMPLEX PARTIAL SEIZURES WITH CONSCIOUSNESS IMPAIRED (HCC): Primary | ICD-10-CM

## 2018-01-19 DIAGNOSIS — Z79.899 LONG-TERM USE OF HIGH-RISK MEDICATION: ICD-10-CM

## 2018-01-19 NOTE — PATIENT INSTRUCTIONS
[facility]   Statement to Patients  April 1, 2014      In an effort to ensure the large volume of patient prescription refills is processed in the most efficient and expeditious manner, we are asking our patients to assist us by calling your Pharmacy for all prescription refills, this will include also your  Mail Order Pharmacy. The pharmacy will contact our office electronically to continue the refill process. Please do not wait until the last minute to call your pharmacy. We need at least 48 hours (2days) to fill prescriptions. We also encourage you to call your pharmacy before going to  your prescription to make sure it is ready. With regard to controlled substance prescription refill requests (narcotic refills) that need to be picked up at our office, we ask your cooperation by providing us with at least 72 hours (3days) notice that you will need a refill. We will not refill narcotic prescription refill requests after 4:00pm on any weekday, Monday through Thursday, or after 2:00pm on Fridays, or on the weekends. We encourage everyone to explore another way of getting your prescription refill request processed using ViaView, our patient web portal through our electronic medical record system. ViaView is an efficient and effective way to communicate your medication request directly to the office and  downloadable as an lacho on your smart phone . ViaView also features a review functionality that allows you to view your medication list as well as leave messages for your physician. Are you ready to get connected? If so please review the attatched instructions or speak to any of our staff to get you set up right away! Thank you so much for your cooperation. Should you have any questions please contact our Practice Administrator.     The Physicians and Staff,  [facility]          A Healthy Lifestyle: Care Instructions  Your Care Instructions    A healthy lifestyle can help you feel good, stay at a healthy weight, and have plenty of energy for both work and play. A healthy lifestyle is something you can share with your whole family. A healthy lifestyle also can lower your risk for serious health problems, such as high blood pressure, heart disease, and diabetes. You can follow a few steps listed below to improve your health and the health of your family. Follow-up care is a key part of your treatment and safety. Be sure to make and go to all appointments, and call your doctor if you are having problems. It's also a good idea to know your test results and keep a list of the medicines you take. How can you care for yourself at home? · Do not eat too much sugar, fat, or fast foods. You can still have dessert and treats now and then. The goal is moderation. · Start small to improve your eating habits. Pay attention to portion sizes, drink less juice and soda pop, and eat more fruits and vegetables. ¨ Eat a healthy amount of food. A 3-ounce serving of meat, for example, is about the size of a deck of cards. Fill the rest of your plate with vegetables and whole grains. ¨ Limit the amount of soda and sports drinks you have every day. Drink more water when you are thirsty. ¨ Eat at least 5 servings of fruits and vegetables every day. It may seem like a lot, but it is not hard to reach this goal. A serving or helping is 1 piece of fruit, 1 cup of vegetables, or 2 cups of leafy, raw vegetables. Have an apple or some carrot sticks as an afternoon snack instead of a candy bar. Try to have fruits and/or vegetables at every meal.  · Make exercise part of your daily routine. You may want to start with simple activities, such as walking, bicycling, or slow swimming. Try to be active 30 to 60 minutes every day. You do not need to do all 30 to 60 minutes all at once. For example, you can exercise 3 times a day for 10 or 20 minutes.  Moderate exercise is safe for most people, but it is always a good idea to talk to your doctor before starting an exercise program.  · Keep moving. Sveta Awais the lawn, work in the garden, or [employer]. Take the stairs instead of the elevator at work. · If you smoke, quit. People who smoke have an increased risk for heart attack, stroke, cancer, and other lung illnesses. Quitting is hard, but there are ways to boost your chance of quitting tobacco for good. ¨ Use nicotine gum, patches, or lozenges. ¨ Ask your doctor about stop-smoking programs and medicines. ¨ Keep trying. In addition to reducing your risk of diseases in the future, you will notice some benefits soon after you stop using tobacco. If you have shortness of breath or asthma symptoms, they will likely get better within a few weeks after you quit. · Limit how much alcohol you drink. Moderate amounts of alcohol (up to 2 drinks a day for men, 1 drink a day for women) are okay. But drinking too much can lead to liver problems, high blood pressure, and other health problems. Family health  If you have a family, there are many things you can do together to improve your health. · Eat meals together as a family as often as possible. · Eat healthy foods. This includes fruits, vegetables, lean meats and dairy, and whole grains. · Include your family in your fitness plan. Most people think of activities such as jogging or tennis as the way to fitness, but there are many ways you and your family can be more active. Anything that makes you breathe hard and gets your heart pumping is exercise. Here are some tips:  ¨ Walk to do errands or to take your child to school or the bus. ¨ Go for a family bike ride after dinner instead of watching TV. Where can you learn more? Go to http://alvin-lizette.info/. Enter U020 in the search box to learn more about \"A Healthy Lifestyle: Care Instructions. \"  Current as of:  May 12, 2017  Content Version: 11.4  © 8306-9630 Healthwise, [employer]. Care instructions adapted under license by [employer] (which disclaims liability or warranty for this information). If you have questions about a medical condition or this instruction, always ask your healthcare professional. [place] [address] any warranty or liability for your use of this information.

## 2018-01-19 NOTE — MR AVS SNAPSHOT
[address], 
[address], [address] [facility] 
[phone] Patient: Breana Begum MRN:  [id number] Visit Information Date & Time Provider Department Dept. Phone Encounter #  
 1/19/2018  2:00 PM Zacarias Mcintosh NP [facility] [phone] [id number] Your Appointments 7/31/2018  2:20 PM  
Follow Up with Lina France MD  
[facility] [address] [facility]) Appt Note: Follow up $0CP tdb 1/19/18  
 [address] [place] [address], 
[address] [place] [address], [address] [phone]  
[address] [facility], [address] [place] [address], [address] [facility] [address] [address] Upcoming Health Maintenance Date Due Pneumococcal 19-64 Medium Risk (1 of 1 - PPSV23) 4/11/2003 Influenza Age 5 to Adult 8/1/2017 DTaP/Tdap/Td series (2 - Td) 4/17/2026 Allergies as of 1/19/2018  Review Complete On: 1/19/2018 By: Laura Lobo LPN No Known Allergies Current Immunizations  Never Reviewed Name Date Tdap 4/17/2016  1:57 AM  
  
 Not reviewed this visit You Were Diagnosed With   
  
 Codes Comments Complex partial seizures with consciousness impaired ([facility] [address]    -  Primary ICD-10-CM: T14.990 ICD-9-CM: 345.40 Therapeutic drug monitoring     ICD-10-CM: Z51.81 
ICD-9-CM: V58.83 Vitals BP Pulse Height(growth percentile) Weight(growth percentile) SpO2 BMI  
 100/70 89 5' 10\" (1.778 m) 228 lb (103.4 kg) 98% 32.71 kg/m2 Smoking Status Current Every Day Smoker Vitals History BMI and BSA Data Body Mass Index Body Surface Area 32.71 kg/m 2 2.26 m 2 Preferred Pharmacy Pharmacy Name Phone [facility] [address], [facility] [address] [phone] Your Updated Medication List  
  
   
 This list is accurate as of: 1/19/18  2:22 PM.  Always use your most recent med list.  
  
  
  
  
 B COMPLEX 1 tablet Generic drug:  b complex vitamins Take 1 Tab by mouth daily. FLUoxetine 40 mg capsule Commonly known as:  PROzac TAKE 1 CAPSULE BY MOUTH DAILY  
  
 nabumetone 500 mg tablet Commonly known as:  RELAFEN  
TAKE 1 TABLET BY MOUTH TWICE DAILY AS NEEDED FOR PAIN  
  
 * OXcarbazepine 150 mg tablet Commonly known as:  TRILEPTAL Take 1 Tab by mouth two (2) times a day. Take with 600 mg tab * OXcarbaxepine 600 mg tablet Commonly known as:  TRILEPTAL  
TAKE 1 TABLET BY MOUTH TWICE DAILY  
  
 VITAMIN D3 1,000 unit Cap Generic drug:  cholecalciferol Take  by mouth. * Notice: This list has 2 medication(s) that are the same as other medications prescribed for you. Read the directions carefully, and ask your doctor or other care provider to review them with you. We Performed the Following OXCARBAZEPINE(TRILEPTAL) G032602 CPT(R)] Patient Instructions PRESCRIPTION REFILL POLICY [facility] Statement to Patients April 1, 2014 In an effort to ensure the large volume of patient prescription refills is processed in the most efficient and expeditious manner, we are asking our patients to assist us by calling your Pharmacy for all prescription refills, this will include also your  Mail Order Pharmacy. The pharmacy will contact our office electronically to continue the refill process. Please do not wait until the last minute to call your pharmacy. We need at least 48 hours (2days) to fill prescriptions. We also encourage you to call your pharmacy before going to  your prescription to make sure it is ready.   
 
With regard to controlled substance prescription refill requests (narcotic refills) that need to be picked up at our office, we ask your cooperation by providing us with at least 72 hours (3days) notice that you will need a refill. We will not refill narcotic prescription refill requests after 4:00pm on any weekday, Monday through Thursday, or after 2:00pm on Fridays, or on the weekends. We encourage everyone to explore another way of getting your prescription refill request processed using [employer], our patient web portal through our electronic medical record system. [employer] is an efficient and effective way to communicate your medication request directly to the office and  downloadable as an lacho on your smart phone . [employer] also features a review functionality that allows you to view your medication list as well as leave messages for your physician. Are you ready to get connected? If so please review the attatched instructions or speak to any of our staff to get you set up right away! Thank you so much for your cooperation. Should you have any questions please contact our Practice Administrator. The Physicians and Staff,  [facility] A Healthy Lifestyle: Care Instructions Your Care Instructions A healthy lifestyle can help you feel good, stay at a healthy weight, and have plenty of energy for both work and play. A healthy lifestyle is something you can share with your whole family. A healthy lifestyle also can lower your risk for serious health problems, such as high blood pressure, heart disease, and diabetes. You can follow a few steps listed below to improve your health and the health of your family. Follow-up care is a key part of your treatment and safety. Be sure to make and go to all appointments, and call your doctor if you are having problems. It's also a good idea to know your test results and keep a list of the medicines you take. How can you care for yourself at home? · Do not eat too much sugar, fat, or fast foods. You can still have dessert and treats now and then. The goal is moderation. · Start small to improve your eating habits. Pay attention to portion sizes, drink less juice and soda pop, and eat more fruits and vegetables. ¨ Eat a healthy amount of food. A 3-ounce serving of meat, for example, is about the size of a deck of cards. Fill the rest of your plate with vegetables and whole grains. ¨ Limit the amount of soda and sports drinks you have every day. Drink more water when you are thirsty. ¨ Eat at least 5 servings of fruits and vegetables every day. It may seem like a lot, but it is not hard to reach this goal. A serving or helping is 1 piece of fruit, 1 cup of vegetables, or 2 cups of leafy, raw vegetables. Have an apple or some carrot sticks as an afternoon snack instead of a candy bar. Try to have fruits and/or vegetables at every meal. 
· Make exercise part of your daily routine. You may want to start with simple activities, such as walking, bicycling, or slow swimming. Try to be active 30 to 60 minutes every day. You do not need to do all 30 to 60 minutes all at once. For example, you can exercise 3 times a day for 10 or 20 minutes. Moderate exercise is safe for most people, but it is always a good idea to talk to your doctor before starting an exercise program. 
· Keep moving. Veto Kareen the lawn, work in the garden, or Miner. Take the stairs instead of the elevator at work. · If you smoke, quit. People who smoke have an increased risk for heart attack, stroke, cancer, and other lung illnesses. Quitting is hard, but there are ways to boost your chance of quitting tobacco for good. ¨ Use nicotine gum, patches, or lozenges. ¨ Ask your doctor about stop-smoking programs and medicines. ¨ Keep trying. In addition to reducing your risk of diseases in the future, you will notice some benefits soon after you stop using tobacco. If you have shortness of breath or asthma symptoms, they will likely get better within a few weeks after you quit. · Limit how much alcohol you drink. Moderate amounts of alcohol (up to 2 drinks a day for men, 1 drink a day for women) are okay. But drinking too much can lead to liver problems, high blood pressure, and other health problems. Family health If you have a family, there are many things you can do together to improve your health. · Eat meals together as a family as often as possible. · Eat healthy foods. This includes fruits, vegetables, lean meats and dairy, and whole grains. · Include your family in your fitness plan. Most people think of activities such as jogging or tennis as the way to fitness, but there are many ways you and your family can be more active. Anything that makes you breathe hard and gets your heart pumping is exercise. Here are some tips: 
¨ Walk to do errands or to take your child to school or the bus. ¨ Go for a family bike ride after dinner instead of watching TV. Where can you learn more? Go to http://alvin-lizette.info/. Enter L769 in the search box to learn more about \"A Healthy Lifestyle: Care Instructions. \" Current as of: May 12, 2017 Content Version: 11.4 © 9136-5426 [employer]. Care instructions adapted under license by [employer] (which disclaims liability or warranty for this information). If you have questions about a medical condition or this instruction, always ask your healthcare professional. [address] any warranty or liability for your use of this information. Introducing [facility]! [facility] introduces [employer] patient portal. Now you can access parts of your medical record, email your doctor's office, and request medication refills online. 1. In your internet browser, go to [email or web address] 2. Click on the First Time User? Click Here link in the Sign In box. You will see the New Member Sign Up page. 3. Enter your [employer] Access Code exactly as it appears below. You will not need to use this code after youve completed the sign-up process. If you do not sign up before the expiration date, you must request a new code. · C[employer] Access Code: [email or web address] Expires: 1/29/2018 10:25 AM 
 
4. Enter the last four digits of your Social Security Number (xxxx) and Date of Birth (mm/dd/yyyy) as indicated and click Submit. You will be taken to the next sign-up page. 5. Create a CEL-SCI ID. This will be your [employer] login ID and cannot be changed, so think of one that is secure and easy to remember. 6. Create a [employer] password. You can change your password at any time. 7. Enter your Password Reset Question and Answer. This can be used at a later time if you forget your password. 8. Enter your e-mail address. You will receive e-mail notification when new information is available in [address] 9. Click Sign Up. You can now view and download portions of your medical record. 10. Click the Download Summary menu link to download a portable copy of your medical information. If you have questions, please visit the Frequently Asked Questions section of the [employer] website. Remember, [employer] is NOT to be used for urgent needs. For medical emergencies, dial 911. Now available from your iPhone and Android! Please provide this summary of care documentation to your next provider. Your primary care clinician is listed as Chelsea Cormier If you have any questions after today's visit, please call [phone].

## 2018-01-19 NOTE — PROGRESS NOTES
Date:            2018    Name:  Basil Chavez  :  1985  MRN:  [id number]     PCP:  Faustino Millard DO    Chief Complaint   Patient presents with    Follow-up    Seizure         HISTORY OF PRESENT ILLNESS:  Parisa Rojo is a 34 y.o., male who presents today for follow up for seizures. Is on Trileptal currently, was on Depakote in the past.  He was last seen in October, reported that he had had a seizure a week prior to being seen. Trileptal was increased to 750 mg twice daily, level was 11 at that time. He does not think that he has had a seizure since he was last seen. He has no side effects on the higher dose of the trileptal, is not dizzy or falling over. He is not missing pills, is sleeping well. He is still not routinely seeing a psychiatrist for his bipolar disorder, but thinks that he needs to see one to get DMV paperwork done. He does not think that he currently has any issues with his mood. When advised that he cannot drive until he is seizure free for 6 months, because he might hurt himself or somebody else, he informed me that people should keep their children off the road so he has one less thing to worry about. I do not think that his personality disorder is currently well controlled. 10.31.2017 recap  Parisa Rojo is a 34 y.o., male who presents today for follow up for seizures. He was on Depakote the past, both for seizures and for bipolar disorder, he did not tolerate that and decided to switch back to Trileptal in May 2016. He has had relatively good control on Trileptal, but has had a few breakthrough seizures. Last seizure was less than a week ago, before that he thinks it may have been in May of this year. He reports that he has been taking medication consistently, is not missing doses of his Trileptal. He was not sick when his breakthrough seizure happened.   His urine drug screen was positive for cocaine when he had a seizure in May, negative for alcohol. He has not been sleeping well, he tosses and turns which is not new for him. He is not following with psychiatry consistently but knows that he needs to. His seizures started at 5months of age, no known cause. No h/o head injury or meningitis. He does not drink alcohol, no drug use currently per his report. Current Outpatient Prescriptions   Medication Sig    OXcarbaxepine (TRILEPTAL) 600 mg tablet TAKE 1 TABLET BY MOUTH TWICE DAILY    nabumetone (RELAFEN) 500 mg tablet TAKE 1 TABLET BY MOUTH TWICE DAILY AS NEEDED FOR PAIN    OXcarbazepine (TRILEPTAL) 150 mg tablet Take 1 Tab by mouth two (2) times a day. Take with 600 mg tab    FLUoxetine (PROZAC) 40 mg capsule TAKE 1 CAPSULE BY MOUTH DAILY    Cholecalciferol, Vitamin D3, (VITAMIN D3) 1,000 unit cap Take  by mouth.  b complex vitamins (B COMPLEX 1) tablet Take 1 Tab by mouth daily. No current facility-administered medications for this visit. No Known Allergies  Past Medical History:   Diagnosis Date    Anxiety disorder     Arthritis     Rheumatoid     Depression     Bipolar    Unspecified epilepsy without mention of intractable epilepsy     has epilepsy since age 7 mo; most recent 4mo ago     Past Surgical History:   Procedure Laterality Date    HX APPENDECTOMY      as child    HX ORTHOPAEDIC Right     Right hand fracture surgically repaired/Right foot surgery    HX ORTHOPAEDIC      heel spurs     Social History     Social History    Marital status: SINGLE     Spouse name: N/A    Number of children: N/A    Years of education: N/A     Occupational History    Not on file.      Social History Main Topics    Smoking status: Current Every Day Smoker     Packs/day: 1.00    Smokeless tobacco: Never Used    Alcohol use No    Drug use: Yes     Special: Marijuana      Comment: in past, but not currently    Sexual activity: Not on file     Other Topics Concern    Not on file     Social History Narrative     Family History   Problem Relation Age of Onset    Cancer Mother      breast    No Known Problems Father          PHYSICAL EXAMINATION:    Visit Vitals    /70    Pulse 89    Ht 5' 10\" (1.778 m)    Wt 228 lb (103.4 kg)    SpO2 98%    BMI 32.71 kg/m2     General:  Well defined, obese, and groomed individual in no acute distress. Neck: Supple, nontender, no bruits, no pain with resistance to active range of motion. Heart: Regular rate and rhythm, no murmurs, rub, or gallop. Normal S1S2. Lungs:  Clear to auscultation bilaterally with equal chest expansion, no cough, no wheeze  Musculoskeletal:  Extremities revealed no edema and had full range of motion of joints. Psych: Flat, appropriate    NEUROLOGICAL EXAMINATION:     Mental Status:   Alert and oriented to person, place, and time with recent and remote memory intact. Attention span and concentration are normal. Speech is fluent with a full fund of knowledge. Cranial Nerves:    II, III, IV, VI:  Visual acuity grossly intact. Extra-ocular movements are full and fluid. No ptosis or nystagmus. V-XII: Hearing is grossly intact. Facial features are symmetric, with normal sensation and strength. The palate rises symmetrically and the tongue protrudes midline. Sternocleidomastoids 5/5. Motor Examination: Normal tone, bulk, and strength, 5/5 muscle strength throughout. Coordination:  Finger to nose testing was normal.   No resting or intention tremor  Gait and Station:  Steady while walking and with tandem walking. Normal arm swing. No pronator drift. No muscle wasting or fasciculations noted. ASSESSMENT AND PLAN    ICD-10-CM ICD-9-CM    1. Complex partial seizures with consciousness impaired (HCC) G40.209 345.40 OXCARBAZEPINE(TRILEPTAL)   2.  Therapeutic drug monitoring Z51.81 V58.83 OXCARBAZEPINE(TRILEPTAL)   3. Long-term use of high-risk medication S24.241 S53.24 METABOLIC PANEL, COMPREHENSIVE   4. Personality disorder F60.9 301.9    5. Anxiety F41.9 300.00      50-year-old male seen in follow-up for seizures. Last breakthrough seizure that he reports was in October, Trileptal was increased at that time. No perceived side effects with the higher dose. He is still not following with psychiatry for bipolar disorder, he feels that his mood is stable currently. 1.  Continue Trileptal 750 mg twice daily  2. We will get Trileptal level and CMP for safety  3. Encouraged to reestablish with psychiatry for bipolar disorder    Follow-up in 6 months, call sooner with breakthrough seizures    Eduarda Calero NP    This note was created using voice recognition software. Despite editing, there may be syntax errors.

## 2018-01-25 LAB
ALBUMIN SERPL-MCNC: 4.6 G/DL (ref 3.5–5.5)
ALBUMIN/GLOB SERPL: 2 {RATIO} (ref 1.2–2.2)
ALP SERPL-CCNC: 89 IU/L (ref 39–117)
ALT SERPL-CCNC: 10 IU/L (ref 0–44)
AST SERPL-CCNC: 17 IU/L (ref 0–40)
BILIRUB SERPL-MCNC: 0.3 MG/DL (ref 0–1.2)
BUN SERPL-MCNC: 14 MG/DL (ref 6–20)
BUN/CREAT SERPL: 16 (ref 9–20)
CALCIUM SERPL-MCNC: 9.3 MG/DL (ref 8.7–10.2)
CHLORIDE SERPL-SCNC: 100 MMOL/L (ref 96–106)
CO2 SERPL-SCNC: 23 MMOL/L (ref 18–29)
CREAT SERPL-MCNC: 0.9 MG/DL (ref 0.76–1.27)
GFR SERPLBLD CREATININE-BSD FMLA CKD-EPI: 112 ML/MIN/1.73
GFR SERPLBLD CREATININE-BSD FMLA CKD-EPI: 129 ML/MIN/1.73
GLOBULIN SER CALC-MCNC: 2.3 G/DL (ref 1.5–4.5)
GLUCOSE SERPL-MCNC: 104 MG/DL (ref 65–99)
OXCARBAZEPINE SERPL-MCNC: 17 UG/ML (ref 10–35)
POTASSIUM SERPL-SCNC: 4 MMOL/L (ref 3.5–5.2)
PROT SERPL-MCNC: 6.9 G/DL (ref 6–8.5)
SODIUM SERPL-SCNC: 139 MMOL/L (ref 134–144)

## 2018-02-28 ENCOUNTER — TELEPHONE (OUTPATIENT)
Dept: NEUROLOGY | Age: 33
End: 2018-02-28

## 2018-02-28 NOTE — TELEPHONE ENCOUNTER
----- Message from Beti Horan sent at 2/28/2018  3:26 PM EST -----  Regarding: Enrique López/Rx   Pt needs a refill on nabumetone 500 mg at [facility]. Pt stated that he been waiting on refill for a week. Best contact number is [phone].

## 2018-03-01 NOTE — TELEPHONE ENCOUNTER
Message forwarded from Ivon. ----- Message from Cristy Chahal sent at 3/1/2018  8:39 AM EST -----  Regarding: Dr. Martha Ventura  Pt stated he missed a call from the office this morning and would like a call back. Best contact number [phone].

## 2018-03-01 NOTE — TELEPHONE ENCOUNTER
Left patient a message Nabumetone was not prescribed by this offfice, he would need to call PCP for refill.

## 2018-03-01 NOTE — TELEPHONE ENCOUNTER
Message forwarded from [facility]      ----- Message from Leonardo Brown sent at 2/28/2018  4:11 PM EST -----  Regarding: /Telephone  Pt returning call from office. Best contact number is  [phone].

## 2018-05-24 ENCOUNTER — OFFICE VISIT (OUTPATIENT)
Dept: BEHAVIORAL/MENTAL HEALTH CLINIC | Age: 33
End: 2018-05-24

## 2018-05-24 ENCOUNTER — TELEPHONE (OUTPATIENT)
Dept: NEUROLOGY | Age: 33
End: 2018-05-24

## 2018-05-24 VITALS
RESPIRATION RATE: 18 BRPM | DIASTOLIC BLOOD PRESSURE: 82 MMHG | SYSTOLIC BLOOD PRESSURE: 122 MMHG | BODY MASS INDEX: 32.35 KG/M2 | HEIGHT: 70 IN | OXYGEN SATURATION: 98 % | WEIGHT: 226 LBS | HEART RATE: 78 BPM | TEMPERATURE: 98.2 F

## 2018-05-24 NOTE — TELEPHONE ENCOUNTER
Patient states that the [facility] would need an updated for for him in order to drive.  Pt would like to be seen soon in order to drive

## 2018-05-24 NOTE — PROGRESS NOTES
Princess Calles is a 28 y.o. male  Chief Complaint   Patient presents with    New Patient     last seen 2016 for PTSD     1. Have you been to the ER, urgent care clinic since your last visit? Hospitalized since your last visit? No     2. Have you seen or consulted any other health care providers outside of the [facility] since your last visit? Include any pap smears or colon screening.  No     Visit Vitals    /82 (BP 1 Location: Left arm, BP Patient Position: Sitting)    Pulse 78    Temp 98.2 °F (36.8 °C) (Oral)    Resp 18    Ht 5' 10\" (1.778 m)    Wt 102.5 kg (226 lb)    SpO2 98%    BMI 32.43 kg/m2

## 2018-05-24 NOTE — TELEPHONE ENCOUNTER
----- Message from Cristy Newell sent at 5/24/2018  8:31 AM EDT -----  Regarding: Dr. Geetha Garsia  The patient is requesting a call back from the nurse to confirm the date of his last seizure.  [phone]

## 2018-05-24 NOTE — PROGRESS NOTES
Patient hasn't been in since Feb 2016. Last message was that he was going to continue seeing his PCP who had him on Trileptal. He calls this office because he wants this provider to fill out forms for him.  specifically spoke with him yesterday and informed him that he needs to be a patient here for several months before paperwork is filled out and that today would be a med management session. He agreed. Today he come in with the forms in hand, says that his neurologist is treating him for BPAD and that he is stable on his meds, and he wants this provider to fill out forms for him to have his license reinstated. He insists he never got the message. Reiterated our policy with him- he needs to reestablish here as a patient, and after several months, forms could be filled out for him. He is upset and leaves office.

## 2018-05-24 NOTE — PROGRESS NOTES
CHIEF COMPLAINT:  Som Barr is a 28 y.o. male and was seen today for follow-up of psychiatric condition and psychotropic medication management. Last office visit was ***. HPI:    Sussy Bansal reports the following psychiatric symptoms:  {Mental health symptoms:86865}. The symptoms have been present for *** and are of moderate/high severity. The symptoms occur ***. Additional symptoms include  {Psychpp:23437}. FAMILY/SOCIAL HX: *** (2 req)    REVIEW OF SYSTEMS:  Psychiatric:  {psych:45143::\"normal\"}  Appetite:{Psychappetite:62020}   Sleep: {Psychsleep:74599}   MICHELLE: ***  Pt reports the following:  ***  All other systems reviewed and are considered negative.     Side Effects:  {Side effects:92354}    MENTAL STATUS EXAM:     Orientation {Sensorium:92876}   Vital Signs (BP,Pulse, Temp) See below (reviewed)   Gait and Station ***Within normal limits   Abnormal Muscular Movements/Tone/Behavior No EPS, no Tardive Dyskinesia, no abnormal muscular movements; wnl tone   Relations {RELATIONS:61322497}   General Appearance:  {APPEARANCE:69858::\"age appropriate\"}   Language No aphasia or dysarthria   Speech:  {Findings; speech psych:34953}   Thought Processes ***logical, wnl rate of thoughts, good abstract reasoning and computation   Thought Associations {Psych thought process:19159}   Thought Content {thought content:29379}   Suicidal Ideations {PLAN/INTENTION:33038790}   Homicidal Ideations {PLAN/INTENTION:69509600}   Mood:  {Mood:13504}   Affect:  {Desc; affect:63196}   Memory recent  {MEMORY:67462398}   Memory remote:  {MEMORY:20357927}   Concentration/Attention:  {MEMORY:39926179}   Fund of Knowledge Fair/average   Insight:  {Psych insight & judgement:79537}   Reliability {Reliability:07990}   Judgment:  {Psych insight & judgement:67786}       Visit Vitals    /82 (BP 1 Location: Left arm, BP Patient Position: Sitting)    Pulse 78    Temp 98.2 °F (36.8 °C) (Oral)    Resp 18    Ht 5' 10\" (1.778 m)    Wt 102.5 kg (226 lb)    SpO2 98%    BMI 32.43 kg/m2       SCALES: *** on date***. MEDICAL DECISION MAKING:  Problems addressed today:  {No Diagnosis Found}    Assessment:   Kamari {AMB IS/IS XGW:06176} responding to treatment, symptoms are ***. Patient reports that there are/are not*** changes to her medical conditions. Current Outpatient Prescriptions   Medication Sig Dispense Refill    OXcarbaxepine (TRILEPTAL) 600 mg tablet TAKE 1 TABLET BY MOUTH TWICE DAILY 60 Tab 5    nabumetone (RELAFEN) 500 mg tablet TAKE 1 TABLET BY MOUTH TWICE DAILY AS NEEDED FOR PAIN 60 Tab 0    OXcarbazepine (TRILEPTAL) 150 mg tablet Take 1 Tab by mouth two (2) times a day. Take with 600 mg tab 60 Tab 5    FLUoxetine (PROZAC) 40 mg capsule TAKE 1 CAPSULE BY MOUTH DAILY 30 Cap 0    Cholecalciferol, Vitamin D3, (VITAMIN D3) 1,000 unit cap Take  by mouth.  b complex vitamins (B COMPLEX 1) tablet Take 1 Tab by mouth daily. Plan:   1. Medications/ Labs: ***    2.  Counseling and coordination of care including instructions for treatment, risks/benefits, risk factor reduction and patient/family education. He agrees with the plan. Patient instructed to call with any side effects, questions or issues.      3.  Follow-up Disposition: Not on File    5/24/2018  Nikunj Yepez NP

## 2018-05-25 NOTE — TELEPHONE ENCOUNTER
Not sure why patient needs a new appointment as was seen 1/19/2018. I looked and found that because the seizure occurred 10/24/17, the appointment needs to be after the 6 month waiting period from 10/24/17 and needs to be after 4/24/18.     Will call back about his

## 2018-05-29 NOTE — TELEPHONE ENCOUNTER
Called patient and explained, but phone was cut off before I could scheduled.   Will close until call back

## 2019-01-10 DIAGNOSIS — G40.209 COMPLEX PARTIAL SEIZURES WITH CONSCIOUSNESS IMPAIRED (HCC): ICD-10-CM

## 2019-01-10 RX ORDER — OXCARBAZEPINE 600 MG/1
TABLET, FILM COATED ORAL
Qty: 60 TAB | Refills: 3 | Status: SHIPPED | OUTPATIENT
Start: 2019-01-10 | End: 2019-08-07 | Stop reason: SDUPTHER

## 2019-01-17 ENCOUNTER — TELEPHONE (OUTPATIENT)
Dept: NEUROLOGY | Age: 34
End: 2019-01-17

## 2019-03-20 ENCOUNTER — TELEPHONE (OUTPATIENT)
Dept: NEUROLOGY | Age: 34
End: 2019-03-20

## 2019-03-20 NOTE — TELEPHONE ENCOUNTER
Patient called to request a refill on his generic Trileptal. He said that he hasn't had the medication for 2 months.       [phone]

## 2019-03-20 NOTE — TELEPHONE ENCOUNTER
I called the patient's pharmacy and he last filled the medication 12/11/18 and they have the refills of this medication from January when it was sent in. I called the patient and left a message of this and to call me back to discuss medication problems. Patient was worked in for an appointment 1/2019 and no showed to this appointment to note.  Patient has an appointment scheduled for 5/30/19

## 2019-08-07 DIAGNOSIS — G40.209 COMPLEX PARTIAL SEIZURES WITH CONSCIOUSNESS IMPAIRED (HCC): ICD-10-CM

## 2019-08-08 NOTE — TELEPHONE ENCOUNTER
No future appointments. Last Appointment My Department:  1/19/18    Please advise of refill below. Requested Prescriptions     Pending Prescriptions Disp Refills    OXcarbazepine (TRILEPTAL) 600 mg tablet [Pharmacy Med Name: OXCARBAZEPINE 600 MG TABLET] 60 Tab 2     Sig: TAKE ONE TABLET BY MOUTH TWICE A DAY        I called and found that he just ran out of medication yesterday.

## 2019-08-09 RX ORDER — OXCARBAZEPINE 600 MG/1
TABLET, FILM COATED ORAL
Qty: 60 TAB | Refills: 5 | Status: SHIPPED | OUTPATIENT
Start: 2019-08-09 | End: 2020-02-21 | Stop reason: SDUPTHER

## 2020-02-21 ENCOUNTER — OFFICE VISIT (OUTPATIENT)
Dept: NEUROLOGY | Age: 35
End: 2020-02-21

## 2020-02-21 VITALS
HEART RATE: 95 BPM | BODY MASS INDEX: 32.35 KG/M2 | HEIGHT: 70 IN | SYSTOLIC BLOOD PRESSURE: 104 MMHG | OXYGEN SATURATION: 98 % | RESPIRATION RATE: 12 BRPM | DIASTOLIC BLOOD PRESSURE: 70 MMHG | WEIGHT: 226 LBS

## 2020-02-21 DIAGNOSIS — G40.209 COMPLEX PARTIAL SEIZURES WITH CONSCIOUSNESS IMPAIRED (HCC): Primary | ICD-10-CM

## 2020-02-21 RX ORDER — OXCARBAZEPINE 600 MG/1
600 TABLET, FILM COATED ORAL 2 TIMES DAILY
Qty: 180 TAB | Refills: 5 | Status: SHIPPED | OUTPATIENT
Start: 2020-02-21 | End: 2020-03-09

## 2020-02-21 RX ORDER — NABUMETONE 500 MG/1
TABLET, FILM COATED ORAL
Qty: 60 TAB | Refills: 5 | Status: SHIPPED | OUTPATIENT
Start: 2020-02-21 | End: 2021-04-28 | Stop reason: SDUPTHER

## 2020-02-21 NOTE — LETTER
2/21/20 Patient: Darius Thayer YOB: 1985 Date of Visit: 2/21/2020 DO Gay TreviñoMohan Chadwickleliaemelitarunrico Vasquez [address] [facility] [address] [address] VIA In Basket Dear Professor Anabelle Dc DO, Thank you for referring Mr. Shalom Parsons to [address] for evaluation. My notes for this consultation are attached. Consult Subjective:  
 
Darius Thayer is a 27 y.o. right-handed  male seen for evaluation at the request of Dr. Gonzales Finders of new problem of needing his DMV form filled out, after I have not seen him in almost 4 years, but he saw the nurse practitioner 2 or 3 years ago. He had a seizure in 2018 secondary to low Trileptal level, and had a dose increase from 600 mg to 7 on 50 mg twice a day, but went back to the 600 mg because of side effects and has had no seizures since then. His last EEG in 2018 was normal.  His last Trileptal level was 15 in the normal range. He needs a refill on his medication that was done today also. He was taken off Depakote because of side effects, but has tolerated the Trileptal well. Prescription sent him to the patient to the pharmacy today. 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 He has undergone bilateral heel surgery but ambulates fairly well. He had a previous left temporal focus on his EEG in the past. His repeat EEG done 11 2015 was normal. He has had normal imaging of the brain in the past. He denies any new headache, new focal weakness or sensory loss, gait problems, recent toxin or trauma or other causes for his seizures. He denies missing any medications.   Patient had seizures in 2004, and was previously on Trileptal. He had no other focal neurological symptoms, no weakness no numbness no headaches no vision problems, no gait problems or cognitive issues. He had normal imaging of the brain, and EEG showed left temporal abnormality in the past.   
 
Complete review of systems and symptoms with positive for depression only, but no other medical problems palpitations or illnesses Past Medical History:  
Diagnosis Date  Anxiety disorder  Arthritis Rheumatoid  Depression Bipolar  Unspecified epilepsy without mention of intractable epilepsy   
 has epilepsy since age 6 mo; most recent 4mo ago Past Surgical History:  
Procedure Laterality Date  HX APPENDECTOMY    
 as child  HX ORTHOPAEDIC Right Right hand fracture surgically repaired/Right foot surgery  HX ORTHOPAEDIC    
 heel spurs Family History Problem Relation Age of Onset  Cancer Mother   
     breast  
 No Known Problems Father Social History Tobacco Use  Smoking status: Current Every Day Smoker Packs/day: 1.00  Smokeless tobacco: Never Used  Tobacco comment: [id number] Substance Use Topics  Alcohol use: No  
   
Current Outpatient Medications Medication Sig Dispense Refill  OXcarbazepine (TRILEPTAL) 600 mg tablet Take 1 Tab by mouth two (2) times a day. 180 Tab 5  
 nabumetone (RELAFEN) 500 mg tablet TAKE 1 TABLET BY MOUTH TWICE DAILY AS NEEDED FOR PAIN 60 Tab 5  Cholecalciferol, Vitamin D3, (VITAMIN D3) 1,000 unit cap Take  by mouth.  b complex vitamins (B COMPLEX 1) tablet Take 1 Tab by mouth daily. No Known Allergies Review of Systems: A comprehensive review of systems was negative except for: Neurological: positive for seizures Behvioral/Psych: positive for depression Objective: I 
 
 
NEUROLOGICAL EXAM: 
 
Appearance: The patient is well developed, well nourished, provides a coherent history and is in no acute distress. Mental Status: Oriented to time, place and person. Mood and affect depressed.   Patient speech is fluent, without aphasia or dysarthria and mental status appears normal as patient is oriented x4. Cranial Nerves:   Intact visual fields. Fundi are benign, and discs appear flat. . LISSET, EOM's full, no nystagmus, no ptosis. Facial sensation is normal. Corneal reflexes are not tested. Facial movement is symmetric. Hearing is normal bilaterally. Palate is midline with normal sternocleidomastoid and trapezius muscles are normal. Tongue is midline. Patient appears mildly depressed, but cooperative Motor:  5/5 strength in upper and lower proximal and distal muscles. Normal bulk and tone. No fasciculations. Rapid alternating movement is a bit slow but symmetric Reflexes:   Deep tendon reflexes 2+/4 and symmetrical. No Babinski or clonus present Sensory:   Normal to touch, pinprick and vibration and temperature Gait:   Mildly abnormal gait because he limps on his feet bilaterally because of heel pain Tremor:   No tremor noted. Cerebellar:  No cerebellar signs present on Romberg and tandem and finger-nose-finger exam.  
Neurovascular:  Normal heart sounds and regular rhythm, peripheral pulses intact, and no carotid bruits. Assessment:  
 
Partial complex and secondary generalized seizures with need for Medication changed because he does not like Depakote and wants to go back on Trileptal 
 
Plan:  
 
Prescription for Trileptal sent in for patient today, he is continue 600 mg twice a day thereafter He has discontinued his Depakote His DMV form filled out in detail for the patient today. He is to contact his psychiatrist for close psychiatric followup because of his bipolar disorder He will call if any problems. 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 Patient to be seen again in 6 months time or earlier if needed they will call if any problem in the interim Patient may need ambulatory 24 hour EEG monitoring if any recurrent seizure Signed By: Beckie Villareal MD   
 February 21, 2020 This note will not be viewable in [address] If you have questions, please do not hesitate to call me. I look forward to following your patient along with you. Sincerely, Beckie Villareal MD

## 2020-02-21 NOTE — PATIENT INSTRUCTIONS
A Healthy Lifestyle: Care Instructions  Your Care Instructions    A healthy lifestyle can help you feel good, stay at a healthy weight, and have plenty of energy for both work and play. A healthy lifestyle is something you can share with your whole family. A healthy lifestyle also can lower your risk for serious health problems, such as high blood pressure, heart disease, and diabetes. You can follow a few steps listed below to improve your health and the health of your family. Follow-up care is a key part of your treatment and safety. Be sure to make and go to all appointments, and call your doctor if you are having problems. It's also a good idea to know your test results and keep a list of the medicines you take. How can you care for yourself at home? · Do not eat too much sugar, fat, or fast foods. You can still have dessert and treats now and then. The goal is moderation. · Start small to improve your eating habits. Pay attention to portion sizes, drink less juice and soda pop, and eat more fruits and vegetables. ? Eat a healthy amount of food. A 3-ounce serving of meat, for example, is about the size of a deck of cards. Fill the rest of your plate with vegetables and whole grains. ? Limit the amount of soda and sports drinks you have every day. Drink more water when you are thirsty. ? Eat at least 5 servings of fruits and vegetables every day. It may seem like a lot, but it is not hard to reach this goal. A serving or helping is 1 piece of fruit, 1 cup of vegetables, or 2 cups of leafy, raw vegetables. Have an apple or some carrot sticks as an afternoon snack instead of a candy bar. Try to have fruits and/or vegetables at every meal.  · Make exercise part of your daily routine. You may want to start with simple activities, such as walking, bicycling, or slow swimming. Try to be active 30 to 60 minutes every day. You do not need to do all 30 to 60 minutes all at once.  For example, you can exercise 3 times a day for 10 or 20 minutes. Moderate exercise is safe for most people, but it is always a good idea to talk to your doctor before starting an exercise program.  · Keep moving. [place] Feeling the lawn, work in the garden, or [employer]. Take the stairs instead of the elevator at work. · If you smoke, quit. People who smoke have an increased risk for heart attack, stroke, cancer, and other lung illnesses. Quitting is hard, but there are ways to boost your chance of quitting tobacco for good. ? Use nicotine gum, patches, or lozenges. ? Ask your doctor about stop-smoking programs and medicines. ? Keep trying. In addition to reducing your risk of diseases in the future, you will notice some benefits soon after you stop using tobacco. If you have shortness of breath or asthma symptoms, they will likely get better within a few weeks after you quit. · Limit how much alcohol you drink. Moderate amounts of alcohol (up to 2 drinks a day for men, 1 drink a day for women) are okay. But drinking too much can lead to liver problems, high blood pressure, and other health problems. Family health  If you have a family, there are many things you can do together to improve your health. · Eat meals together as a family as often as possible. · Eat healthy foods. This includes fruits, vegetables, lean meats and dairy, and whole grains. · Include your family in your fitness plan. Most people think of activities such as jogging or tennis as the way to fitness, but there are many ways you and your family can be more active. Anything that makes you breathe hard and gets your heart pumping is exercise. Here are some tips:  ? Walk to do errands or to take your child to school or the bus.  ? Go for a family bike ride after dinner instead of watching TV. Where can you learn more? Go to http://alvin-lizette.info/. Enter C527 in the search box to learn more about \"A Healthy Lifestyle: Care Instructions. \"  Current as of: May 28, 2019  Content Version: 12.2  © 4526-4156 [employer]. Care instructions adapted under license by [employer] (which disclaims liability or warranty for this information). If you have questions about a medical condition or this instruction, always ask your healthcare professional. [address] any warranty or liability for your use of this information. Office Policies    o Phone calls/patient messages:  Please allow up to 24 hours for someone in the office to contact you about your call or message. Be mindful your provider may be out of the office or your message may require further review. We encourage you to use [employer] for your messages as this is a faster, more efficient way to communicate with our office    o Medication Refills:  Prescription medications require up to 48 business hours to process. We encourage you to use [employer] for your refills. For controlled medications: Please allow up to 72 business hours to process. Certain medications may require you to  a written prescription at our office. NO narcotic/controlled medications will be prescribed after 4pm Monday through Friday or on weekends    o Form/Paperwork Completion:  We ask that you allow 7-14 business days. You may also download your forms to [employer] to have your doctor print off.

## 2020-02-22 NOTE — PROGRESS NOTES
Consult    Subjective:     Darius Thayer is a 27 y.o. right-handed  male seen for evaluation at the request of Dr. Gonzales Finders of new problem of needing his DMV form filled out, after I have not seen him in almost 4 years, but he saw the nurse practitioner 2 or 3 years ago. He had a seizure in 2018 secondary to low Trileptal level, and had a dose increase from 600 mg to 7 on 50 mg twice a day, but went back to the 600 mg because of side effects and has had no seizures since then. His last EEG in 2018 was normal.  His last Trileptal level was 15 in the normal range. He needs a refill on his medication that was done today also. He was taken off Depakote because of side effects, but has tolerated the Trileptal well. Prescription sent him to the patient to the pharmacy today. 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 He has undergone bilateral heel surgery but ambulates fairly well. He had a previous left temporal focus on his EEG in the past. His repeat EEG done 11 2015 was normal. He has had normal imaging of the brain in the past. He denies any new headache, new focal weakness or sensory loss, gait problems, recent toxin or trauma or other causes for his seizures. He denies missing any medications. Patient had seizures in 2004, and was previously on Trileptal. He had no other focal neurological symptoms, no weakness no numbness no headaches no vision problems, no gait problems or cognitive issues.  He had normal imaging of the brain, and EEG showed left temporal abnormality in the past.      Complete review of systems and symptoms with positive for depression only, but no other medical problems palpitations or illnesses    Past Medical History:   Diagnosis Date    Anxiety disorder     Arthritis     Rheumatoid     Depression     Bipolar    Unspecified epilepsy without mention of intractable epilepsy     has epilepsy since age 6 mo; most recent 4mo ago      Past Surgical History:   Procedure Laterality Date    HX APPENDECTOMY      as child    HX ORTHOPAEDIC Right     Right hand fracture surgically repaired/Right foot surgery    HX ORTHOPAEDIC      heel spurs     Family History   Problem Relation Age of Onset    Cancer Mother         breast    No Known Problems Father       Social History     Tobacco Use    Smoking status: Current Every Day Smoker     Packs/day: 1.00    Smokeless tobacco: Never Used    Tobacco comment: [id number]   Substance Use Topics    Alcohol use: No       Current Outpatient Medications   Medication Sig Dispense Refill    OXcarbazepine (TRILEPTAL) 600 mg tablet Take 1 Tab by mouth two (2) times a day. 180 Tab 5    nabumetone (RELAFEN) 500 mg tablet TAKE 1 TABLET BY MOUTH TWICE DAILY AS NEEDED FOR PAIN 60 Tab 5    Cholecalciferol, Vitamin D3, (VITAMIN D3) 1,000 unit cap Take  by mouth.  b complex vitamins (B COMPLEX 1) tablet Take 1 Tab by mouth daily. No Known Allergies     Review of Systems:  A comprehensive review of systems was negative except for: Neurological: positive for seizures  Behvioral/Psych: positive for depression     Objective:     I      NEUROLOGICAL EXAM:    Appearance: The patient is well developed, well nourished, provides a coherent history and is in no acute distress. Mental Status: Oriented to time, place and person. Mood and affect depressed. Patient speech is fluent, without aphasia or dysarthria and mental status appears normal as patient is oriented x4. Cranial Nerves:   Intact visual fields. Fundi are benign, and discs appear flat. . LISSET, EOM's full, no nystagmus, no ptosis. Facial sensation is normal. Corneal reflexes are not tested. Facial movement is symmetric. Hearing is normal bilaterally. Palate is midline with normal sternocleidomastoid and trapezius muscles are normal. Tongue is midline.   Patient appears mildly depressed, but cooperative   Motor:  5/5 strength in upper and lower proximal and distal muscles. Normal bulk and tone. No fasciculations. Rapid alternating movement is a bit slow but symmetric   Reflexes:   Deep tendon reflexes 2+/4 and symmetrical. No Babinski or clonus present   Sensory:   Normal to touch, pinprick and vibration and temperature   Gait:   Mildly abnormal gait because he limps on his feet bilaterally because of heel pain   Tremor:   No tremor noted. Cerebellar:  No cerebellar signs present on Romberg and tandem and finger-nose-finger exam.   Neurovascular:  Normal heart sounds and regular rhythm, peripheral pulses intact, and no carotid bruits. Assessment:     Partial complex and secondary generalized seizures with need for Medication changed because he does not like Depakote and wants to go back on Trileptal    Plan:     Prescription for Trileptal sent in for patient today, he is continue 600 mg twice a day thereafter  He has discontinued his Depakote  His DMV form filled out in detail for the patient today. He is to contact his psychiatrist for close psychiatric followup because of his bipolar disorder  He will call if any problems. 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 Patient to be seen again in 6 months time or earlier if needed they will call if any problem in the interim  Patient may need ambulatory 24 hour EEG monitoring if any recurrent seizure    Signed By: Kristina Frias MD     February 21, 2020       This note will not be viewable in [address]

## 2020-03-09 DIAGNOSIS — G40.209 COMPLEX PARTIAL SEIZURES WITH CONSCIOUSNESS IMPAIRED (HCC): ICD-10-CM

## 2020-03-09 RX ORDER — OXCARBAZEPINE 600 MG/1
TABLET, FILM COATED ORAL
Qty: 60 TAB | Refills: 4 | Status: SHIPPED | OUTPATIENT
Start: 2020-03-09 | End: 2021-04-28 | Stop reason: SDUPTHER

## 2020-03-15 LAB — OXCARBAZEPINE SERPL-MCNC: 9 UG/ML (ref 10–35)

## 2020-10-22 ENCOUNTER — OFFICE VISIT (OUTPATIENT)
Dept: NEUROLOGY | Age: 35
End: 2020-10-22
Payer: MEDICAID

## 2020-10-22 VITALS
TEMPERATURE: 96.8 F | WEIGHT: 237.8 LBS | HEIGHT: 70 IN | SYSTOLIC BLOOD PRESSURE: 112 MMHG | BODY MASS INDEX: 34.04 KG/M2 | RESPIRATION RATE: 16 BRPM | OXYGEN SATURATION: 100 % | HEART RATE: 88 BPM | DIASTOLIC BLOOD PRESSURE: 84 MMHG

## 2020-10-22 DIAGNOSIS — S09.90XA CLOSED HEAD INJURY, INITIAL ENCOUNTER: ICD-10-CM

## 2020-10-22 DIAGNOSIS — M25.50 ARTHRALGIA, UNSPECIFIED JOINT: ICD-10-CM
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
## 2020-10-22 DIAGNOSIS — M79.604 PAIN IN BOTH LOWER EXTREMITIES: ICD-10-CM

## 2020-10-22 DIAGNOSIS — E56.9 VITAMIN DEFICIENCY: ICD-10-CM

## 2020-10-22 DIAGNOSIS — M79.605 PAIN IN BOTH LOWER EXTREMITIES: ICD-10-CM

## 2020-10-22 DIAGNOSIS — M79.10 MYALGIA: ICD-10-CM

## 2020-10-22 DIAGNOSIS — G40.209 COMPLEX PARTIAL SEIZURES WITH CONSCIOUSNESS IMPAIRED (HCC): Primary | ICD-10-CM

## 2020-10-22 PROCEDURE — 99215 OFFICE O/P EST HI 40 MIN: CPT | Performed by: PSYCHIATRY & NEUROLOGY

## 2020-10-22 RX ORDER — GABAPENTIN 100 MG/1
CAPSULE ORAL
Qty: 150 CAP | Refills: 1 | Status: SHIPPED | OUTPATIENT
Start: 2020-10-22 | End: 2021-01-20 | Stop reason: DRUGHIGH

## 2020-10-22 NOTE — PATIENT INSTRUCTIONS
You are already aware no driving for 6 months from date of last event    Working to get an MRI of your brain and your lower spine and an EEG  Central scheduling should call you to get those arranged    Additionally we can get blood work to try to evaluate the basis of the pain that you are having and to also check the levels of your seizure medicine    On the start you on gabapentin 100 mg take 1 tablet at breakfast 1 at lunch and 1 at dinner and take 2 at bedtime this is for the pain but it does have some antiseizure properties and it is well    I will see you back in 6 weeks we should have all the studies completed by then and of course were available to you sooner if needed
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
## 2020-11-11 ENCOUNTER — HOSPITAL ENCOUNTER (OUTPATIENT)
Dept: MRI IMAGING | Age: 35
Discharge: HOME | End: 2020-11-11
Payer: MEDICAID

## 2020-11-11 DIAGNOSIS — S09.90XA CLOSED HEAD INJURY, INITIAL ENCOUNTER: ICD-10-CM

## 2020-11-11 DIAGNOSIS — M79.10 MYALGIA: ICD-10-CM

## 2020-11-11 DIAGNOSIS — M79.604 PAIN IN BOTH LOWER EXTREMITIES: ICD-10-CM

## 2020-11-11 DIAGNOSIS — M25.50 ARTHRALGIA, UNSPECIFIED JOINT: ICD-10-CM

## 2020-11-11 DIAGNOSIS — M79.605 PAIN IN BOTH LOWER EXTREMITIES: ICD-10-CM

## 2020-11-11 DIAGNOSIS — G40.209 COMPLEX PARTIAL SEIZURES WITH CONSCIOUSNESS IMPAIRED (HCC): ICD-10-CM

## 2020-11-11 PROCEDURE — 74011250636 HC RX REV CODE- 250/636

## 2020-11-11 PROCEDURE — A9575 INJ GADOTERATE MEGLUMI 0.1ML: HCPCS

## 2020-11-11 PROCEDURE — 72148 MRI LUMBAR SPINE W/O DYE: CPT

## 2020-11-11 PROCEDURE — 70553 MRI BRAIN STEM W/O & W/DYE: CPT

## 2020-11-11 RX ORDER — GADOTERATE MEGLUMINE 376.9 MG/ML
20 INJECTION INTRAVENOUS
Status: COMPLETED | OUTPATIENT
Start: 2020-11-11 | End: 2020-11-11

## 2020-11-11 RX ADMIN — GADOTERATE MEGLUMINE 20 ML: 376.9 INJECTION INTRAVENOUS at 17:42

## 2020-11-12 LAB
ACE SERPL-CCNC: 49 U/L (ref 14–82)
ALBUMIN SERPL ELPH-MCNC: 3.3 G/DL (ref 2.9–4.4)
ALBUMIN SERPL-MCNC: 3.8 G/DL (ref 4–5)
ALBUMIN/GLOB SERPL: 1.3 {RATIO} (ref 0.7–1.7)
ALBUMIN/GLOB SERPL: 1.8 {RATIO} (ref 1.2–2.2)
ALP SERPL-CCNC: 95 IU/L (ref 39–117)
ALPHA1 GLOB SERPL ELPH-MCNC: 0.3 G/DL (ref 0–0.4)
ALPHA2 GLOB SERPL ELPH-MCNC: 0.6 G/DL (ref 0.4–1)
ALT SERPL-CCNC: 19 IU/L (ref 0–44)
AST SERPL-CCNC: 24 IU/L (ref 0–40)
B-GLOBULIN SERPL ELPH-MCNC: 1.1 G/DL (ref 0.7–1.3)
BASOPHILS # BLD AUTO: 0.1 X10E3/UL (ref 0–0.2)
BASOPHILS NFR BLD AUTO: 1 %
BILIRUB SERPL-MCNC: 0.2 MG/DL (ref 0–1.2)
BUN SERPL-MCNC: 13 MG/DL (ref 6–20)
BUN/CREAT SERPL: 17 (ref 9–20)
CALCIUM SERPL-MCNC: 8.9 MG/DL (ref 8.7–10.2)
CENTROMERE B AB SER-ACNC: <0.2 AI (ref 0–0.9)
CHLORIDE SERPL-SCNC: 106 MMOL/L (ref 96–106)
CHROMATIN AB SERPL-ACNC: <0.2 AI (ref 0–0.9)
CO2 SERPL-SCNC: 18 MMOL/L (ref 20–29)
CREAT SERPL-MCNC: 0.78 MG/DL (ref 0.76–1.27)
CRP SERPL-MCNC: 4 MG/L (ref 0–10)
DSDNA AB SER-ACNC: 1 IU/ML (ref 0–9)
ENA JO1 AB SER-ACNC: <0.2 AI (ref 0–0.9)
ENA RNP AB SER-ACNC: <0.2 AI (ref 0–0.9)
ENA SCL70 AB SER-ACNC: <0.2 AI (ref 0–0.9)
ENA SM AB SER-ACNC: <0.2 AI (ref 0–0.9)
ENA SM+RNP AB SER-ACNC: <0.2 AI (ref 0–0.9)
ENA SS-A AB SER-ACNC: <0.2 AI (ref 0–0.9)
ENA SS-B AB SER-ACNC: <0.2 AI (ref 0–0.9)
EOSINOPHIL # BLD AUTO: 0.2 X10E3/UL (ref 0–0.4)
EOSINOPHIL NFR BLD AUTO: 2 %
ERYTHROCYTE [DISTWIDTH] IN BLOOD BY AUTOMATED COUNT: 12.4 % (ref 11.6–15.4)
ERYTHROCYTE [SEDIMENTATION RATE] IN BLOOD BY WESTERGREN METHOD: 6 MM/HR (ref 0–15)
GAMMA GLOB SERPL ELPH-MCNC: 0.6 G/DL (ref 0.4–1.8)
GLOBULIN SER CALC-MCNC: 2.1 G/DL (ref 1.5–4.5)
GLOBULIN SER-MCNC: 2.6 G/DL (ref 2.2–3.9)
GLUCOSE SERPL-MCNC: 96 MG/DL (ref 65–99)
HBA1C MFR BLD: 5.1 % (ref 4.8–5.6)
HBV CORE AB SERPL QL IA: NEGATIVE
HCT VFR BLD AUTO: 51.2 % (ref 37.5–51)
HCV AB S/CO SERPL IA: <0.1 S/CO RATIO (ref 0–0.9)
HGB BLD-MCNC: 17.5 G/DL (ref 13–17.7)
IGA SERPL-MCNC: 302 MG/DL (ref 90–386)
IGG SERPL-MCNC: 717 MG/DL (ref 603–1613)
IGM SERPL-MCNC: 35 MG/DL (ref 20–172)
IMM GRANULOCYTES # BLD AUTO: 0.1 X10E3/UL (ref 0–0.1)
IMM GRANULOCYTES NFR BLD AUTO: 1 %
INTERPRETATION SERPL IEP-IMP: NORMAL
LYMPHOCYTES # BLD AUTO: 2 X10E3/UL (ref 0.7–3.1)
LYMPHOCYTES NFR BLD AUTO: 26 %
Lab: NORMAL
M PROTEIN SERPL ELPH-MCNC: NORMAL G/DL
MCH RBC QN AUTO: 32.7 PG (ref 26.6–33)
MCHC RBC AUTO-ENTMCNC: 34.2 G/DL (ref 31.5–35.7)
MCV RBC AUTO: 96 FL (ref 79–97)
METHYLMALONATE SERPL-SCNC: 204 NMOL/L (ref 0–378)
MONOCYTES # BLD AUTO: 0.7 X10E3/UL (ref 0.1–0.9)
MONOCYTES NFR BLD AUTO: 10 %
NEUTROPHILS # BLD AUTO: 4.5 X10E3/UL (ref 1.4–7)
NEUTROPHILS NFR BLD AUTO: 60 %
OXCARBAZEPINE SERPL-MCNC: 12 UG/ML (ref 10–35)
PLATELET # BLD AUTO: 250 X10E3/UL (ref 150–450)
PLEASE NOTE:, 149534: NORMAL
POTASSIUM SERPL-SCNC: 4.9 MMOL/L (ref 3.5–5.2)
PROT SERPL-MCNC: 5.9 G/DL (ref 6–8.5)
RBC # BLD AUTO: 5.35 X10E6/UL (ref 4.14–5.8)
RHEUMATOID FACT SERPL-ACNC: <10 IU/ML (ref 0–13.9)
RIBOSOMAL P AB SER-ACNC: <0.2 AI (ref 0–0.9)
SEE BELOW:, 164879: NORMAL
SODIUM SERPL-SCNC: 139 MMOL/L (ref 134–144)
T4 FREE SERPL-MCNC: 1.29 NG/DL (ref 0.82–1.77)
TSH SERPL DL<=0.005 MIU/L-ACNC: 1.74 UIU/ML (ref 0.45–4.5)
VIT B12 SERPL-MCNC: 309 PG/ML (ref 232–1245)
VIT B6 SERPL-MCNC: 4.7 UG/L (ref 5.3–46.7)
WBC # BLD AUTO: 7.5 X10E3/UL (ref 3.4–10.8)

## 2020-11-12 RX ORDER — LANOLIN ALCOHOL/MO/W.PET/CERES
50 CREAM (GRAM) TOPICAL DAILY
Qty: 30 TAB | Refills: 2 | Status: ON HOLD | OUTPATIENT
Start: 2020-11-12 | End: 2021-05-02

## 2020-11-12 NOTE — PROGRESS NOTES
Please let the patient know that his vitamin B6 was a bit low so I have requested vitamin B6 50 mg 1 daily to be taken; he can just get over-the-counter which should be fine

## 2021-01-07 ENCOUNTER — HOSPITAL ENCOUNTER (OUTPATIENT)
Dept: NEUROLOGY | Age: 36
Discharge: HOME | End: 2021-01-07
Payer: MEDICAID

## 2021-01-07 DIAGNOSIS — G40.209 COMPLEX PARTIAL SEIZURES WITH CONSCIOUSNESS IMPAIRED (HCC): ICD-10-CM

## 2021-01-07 PROCEDURE — 95719 EEG PHYS/QHP EA INCR W/O VID: CPT | Performed by: PSYCHIATRY & NEUROLOGY

## 2021-01-07 PROCEDURE — 95714 VEEG EA 12-26 HR UNMNTR: CPT

## 2021-01-12 ENCOUNTER — TELEPHONE (OUTPATIENT)
Dept: NEUROLOGY | Age: 36
End: 2021-01-12

## 2021-01-18 NOTE — PROCEDURES
[place]  EEG    Name:  Demi Everett  MR#:  [id number]  :  1985  ACCOUNT #:  [de-identified]  DATE OF SERVICE:  2021    24 HOUR EEG REPORT    CLINICAL INDICATION:  This study is done on 71-year-old male with passing out spells. EEG to rule out seizures, rule out cortical abnormality, rule out epilepsy. EEG CLASSIFICATION:  On this patient is essentially normal awake and asleep ambulatory 24 hour EEG recording. DESCRIPTION OF THE RECORD:  This is a 16-channel prolonged EEG recording with EKG monitoring throughout the recording to rule out seizures. During this recording, the patient had a posteriorly located occipital alpha rhythm of 8-9 Hz that did attenuate some with eye opening in the awake state. Neither hyperventilation nor photic stimulation were performed. During the recording, there were no areas of clear focal slowing or spike or spike-and-wave discharges seen. No recorded spells of any type. On the patient's clinical diary, there was no recorded clinical spells either. The patient in the evening hours did enter prolonged states of sleep with K complexes and sleep spindles seen in the central head regions. This study began on 2021 at approximately 10:00 a.m. and ended on 2021 at approximately 10:00 a.m. for a total time of 24 hours on the recording. The study was technically of good quality. There was some movement muscle and electrode artifact seen at times. INTERPRETATION:  This is essentially normal prolonged ambulatory 24 hour EEG recording on the patient to rule out seizures, showing no clear areas of focal slowing or spike discharges and no recorded spells of any type or no clinical spells on the patient's diary either. Terrie Lynch MD      [id number]  D:  2021 17:46  T:  2021 4:05  JOB #:  [id number]  CC:   Moy Washburn MD

## 2021-01-20 ENCOUNTER — VIRTUAL VISIT (OUTPATIENT)
Dept: NEUROLOGY | Age: 36
End: 2021-01-20
Payer: MEDICAID

## 2021-01-20 ENCOUNTER — TELEPHONE (OUTPATIENT)
Dept: NEUROLOGY | Age: 36
End: 2021-01-20

## 2021-01-20 DIAGNOSIS — M79.605 PAIN IN BOTH LOWER EXTREMITIES: ICD-10-CM

## 2021-01-20 DIAGNOSIS — G47.00 INSOMNIA, UNSPECIFIED TYPE: ICD-10-CM

## 2021-01-20 DIAGNOSIS — G40.209 COMPLEX PARTIAL SEIZURES WITH CONSCIOUSNESS IMPAIRED (HCC): Primary | ICD-10-CM

## 2021-01-20 DIAGNOSIS — M79.604 PAIN IN BOTH LOWER EXTREMITIES: ICD-10-CM

## 2021-01-20 DIAGNOSIS — M25.50 ARTHRALGIA, UNSPECIFIED JOINT: ICD-10-CM
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
## 2021-01-20 DIAGNOSIS — M79.10 MYALGIA: ICD-10-CM

## 2021-01-20 PROCEDURE — 99215 OFFICE O/P EST HI 40 MIN: CPT | Performed by: PSYCHIATRY & NEUROLOGY

## 2021-01-20 RX ORDER — GABAPENTIN 300 MG/1
300 CAPSULE ORAL 2 TIMES DAILY
Qty: 60 CAP | Refills: 2 | Status: ON HOLD | OUTPATIENT
Start: 2021-01-20 | End: 2021-05-03 | Stop reason: SDUPTHER

## 2021-01-20 NOTE — PROGRESS NOTES
Patient states seizures are stable does complain of bilateral leg pain. No falls No hospitalizations since last visit. Patient states that his pain level is a 7 out of 10. Attending Attestation (For Attendings USE Only)...
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
## 2021-01-20 NOTE — PATIENT INSTRUCTIONS
Per today's discussion You can  the letter this afternoon I have completed it and is waiting for you at the office and my apologies for not having this addressed earlier. Good luck in court tomorrow To help with the pain and then increase the gabapentin to 300 mg 1 tablet twice a day so 1 in the morning and maybe 1 around dinnertime I am going to make a referral for sleep evaluation I am also going to make referral to rheumatology for further evaluation of this chronic bone pain you are having

## 2021-01-21 ENCOUNTER — TELEPHONE (OUTPATIENT)
Dept: NEUROLOGY | Age: 36
End: 2021-01-21

## 2021-01-21 NOTE — TELEPHONE ENCOUNTER
notified that In order for the NP to complete the paperwork pt brought in for [facility] she needs to see something from the [address] today.  Please drop off or fax us something written by Carmencita Cruz [phone] as to what happened in your meeting with them today&result

## 2021-03-09 ENCOUNTER — TELEPHONE (OUTPATIENT)
Dept: NEUROLOGY | Age: 36
End: 2021-03-09

## 2021-03-09 NOTE — TELEPHONE ENCOUNTER
----- Message from Kenneth Schultz sent at 3/9/2021  1:58 PM EST -----  Regarding: NP Duane Baldy  General Message/Vendor Calls    Caller's first and last name: Pt      Reason for call: DMV papers      Callback required yes/no and why: Yes       Best contact number(s): [phone]      Details to clarify the request: Pt is calling in regards to the [facility] paperwork that he brought into the office for ANNETTE Gonsales to complete in mid January. He wants to know if this has been completed and sent back to the [facility]. Please advise.       Kenneth Schultz

## 2021-04-05 ENCOUNTER — TELEPHONE (OUTPATIENT)
Dept: NEUROLOGY | Age: 36
End: 2021-04-05

## 2021-04-05 NOTE — TELEPHONE ENCOUNTER
Ok I have the [facility] paperwork. ..  I'll fill these out presently based on my note from January 2021

## 2021-04-05 NOTE — TELEPHONE ENCOUNTER
Patient has one phone number and attempted to contact to check on status of need for appointment for DMV forms to return paitent to  Driving status.   Number is not in service

## 2021-04-05 NOTE — TELEPHONE ENCOUNTER
Not months his condition has changed except seen him in the past 6 months and his appointment with me in July  But if something new has happened we should set up a virtual or in office appointment

## 2021-04-28 ENCOUNTER — TRANSCRIBE ORDER (OUTPATIENT)
Dept: NEUROLOGY | Age: 36
End: 2021-04-28

## 2021-04-28 DIAGNOSIS — G40.209 COMPLEX PARTIAL SEIZURES WITH CONSCIOUSNESS IMPAIRED (HCC): ICD-10-CM

## 2021-04-28 RX ORDER — OXCARBAZEPINE 600 MG/1
600 TABLET, FILM COATED ORAL 2 TIMES DAILY
Qty: 60 TAB | Refills: 11 | Status: ON HOLD | OUTPATIENT
Start: 2021-04-28 | End: 2021-05-03 | Stop reason: SDUPTHER

## 2021-04-29 DIAGNOSIS — G40.209 COMPLEX PARTIAL SEIZURES WITH CONSCIOUSNESS IMPAIRED (HCC): ICD-10-CM

## 2021-04-29 RX ORDER — OXCARBAZEPINE 600 MG/1
600 TABLET, FILM COATED ORAL 2 TIMES DAILY
Qty: 60 TAB | Refills: 11 | Status: CANCELLED | OUTPATIENT
Start: 2021-04-29

## 2021-04-29 RX ORDER — NABUMETONE 500 MG/1
TABLET, FILM COATED ORAL
Qty: 60 TAB | Refills: 5 | Status: ON HOLD | OUTPATIENT
Start: 2021-04-29 | End: 2021-05-02

## 2021-05-02 ENCOUNTER — APPOINTMENT (OUTPATIENT)
Dept: CT IMAGING | Age: 36
End: 2021-05-02
Attending: EMERGENCY MEDICINE
Payer: MEDICAID

## 2021-05-02 ENCOUNTER — HOSPITAL ENCOUNTER (OUTPATIENT)
Age: 36
Setting detail: OBSERVATION
Discharge: HOME | End: 2021-05-03
Attending: EMERGENCY MEDICINE | Admitting: INTERNAL MEDICINE
Payer: MEDICAID

## 2021-05-02 ENCOUNTER — APPOINTMENT (OUTPATIENT)
Dept: GENERAL RADIOLOGY | Age: 36
End: 2021-05-02
Attending: INTERNAL MEDICINE
Payer: MEDICAID

## 2021-05-02 ENCOUNTER — APPOINTMENT (OUTPATIENT)
Dept: GENERAL RADIOLOGY | Age: 36
End: 2021-05-02
Attending: EMERGENCY MEDICINE
Payer: MEDICAID

## 2021-05-02 DIAGNOSIS — M79.10 MYALGIA: ICD-10-CM

## 2021-05-02 DIAGNOSIS — M79.604 PAIN IN BOTH LOWER EXTREMITIES: ICD-10-CM

## 2021-05-02 DIAGNOSIS — M25.50 ARTHRALGIA, UNSPECIFIED JOINT: ICD-10-CM

## 2021-05-02 DIAGNOSIS — G40.909 RECURRENT SEIZURES (HCC): Primary | ICD-10-CM

## 2021-05-02 DIAGNOSIS — G40.919 BREAKTHROUGH SEIZURE (HCC): ICD-10-CM

## 2021-05-02 DIAGNOSIS — G47.00 INSOMNIA, UNSPECIFIED TYPE: ICD-10-CM

## 2021-05-02 DIAGNOSIS — E87.29 HIGH ANION GAP METABOLIC ACIDOSIS: ICD-10-CM

## 2021-05-02 DIAGNOSIS — E87.20 LACTIC ACIDOSIS: ICD-10-CM

## 2021-05-02 DIAGNOSIS — E87.6 ACUTE HYPOKALEMIA: ICD-10-CM

## 2021-05-02 DIAGNOSIS — G40.209 COMPLEX PARTIAL SEIZURES WITH CONSCIOUSNESS IMPAIRED (HCC): ICD-10-CM

## 2021-05-02 DIAGNOSIS — S00.81XA ABRASION OF FACE, INITIAL ENCOUNTER: ICD-10-CM

## 2021-05-02 DIAGNOSIS — M79.605 PAIN IN BOTH LOWER EXTREMITIES: ICD-10-CM

## 2021-05-02 PROBLEM — R56.9 SEIZURE, PARTIAL (HCC): Status: ACTIVE | Noted: 2021-05-02

## 2021-05-02 LAB
ALBUMIN SERPL-MCNC: 3.4 G/DL (ref 3.5–5)
ALBUMIN/GLOB SERPL: 0.9 {RATIO} (ref 1.1–2.2)
ALP SERPL-CCNC: 92 U/L (ref 45–117)
ALT SERPL-CCNC: 20 U/L (ref 12–78)
AMPHET UR QL SCN: NEGATIVE
ANION GAP SERPL CALC-SCNC: 10 MMOL/L (ref 5–15)
ANION GAP SERPL CALC-SCNC: 21 MMOL/L (ref 5–15)
APPEARANCE UR: CLEAR
ARTERIAL PATENCY WRIST A: YES
AST SERPL-CCNC: 17 U/L (ref 15–37)
BACTERIA URNS QL MICRO: NEGATIVE /HPF
BARBITURATES UR QL SCN: NEGATIVE
BASE DEFICIT BLDA-SCNC: 4.3 MMOL/L
BASOPHILS # BLD: 0.1 K/UL (ref 0–0.1)
BASOPHILS NFR BLD: 1 % (ref 0–1)
BDY SITE: ABNORMAL
BENZODIAZ UR QL: NEGATIVE
BILIRUB SERPL-MCNC: 0.4 MG/DL (ref 0.2–1)
BILIRUB UR QL: NEGATIVE
BREATHS.SPONTANEOUS ON VENT: 18
BUN SERPL-MCNC: 10 MG/DL (ref 6–20)
BUN SERPL-MCNC: 7 MG/DL (ref 6–20)
BUN/CREAT SERPL: 7 (ref 12–20)
BUN/CREAT SERPL: 8 (ref 12–20)
CALCIUM SERPL-MCNC: 7.7 MG/DL (ref 8.5–10.1)
CALCIUM SERPL-MCNC: 8.7 MG/DL (ref 8.5–10.1)
CANNABINOIDS UR QL SCN: NEGATIVE
CHLORIDE SERPL-SCNC: 103 MMOL/L (ref 97–108)
CHLORIDE SERPL-SCNC: 111 MMOL/L (ref 97–108)
CO2 SERPL-SCNC: 15 MMOL/L (ref 21–32)
CO2 SERPL-SCNC: 23 MMOL/L (ref 21–32)
COCAINE UR QL SCN: NEGATIVE
COLOR UR: ABNORMAL
CREAT SERPL-MCNC: 1 MG/DL (ref 0.7–1.3)
CREAT SERPL-MCNC: 1.26 MG/DL (ref 0.7–1.3)
DIFFERENTIAL METHOD BLD: ABNORMAL
DRUG SCRN COMMENT,DRGCM: NORMAL
EOSINOPHIL # BLD: 0.2 K/UL (ref 0–0.4)
EOSINOPHIL NFR BLD: 2 % (ref 0–7)
EPITH CASTS URNS QL MICRO: ABNORMAL /LPF
ERYTHROCYTE [DISTWIDTH] IN BLOOD BY AUTOMATED COUNT: 12.4 % (ref 11.5–14.5)
ETHANOL SERPL-MCNC: <10 MG/DL
GLOBULIN SER CALC-MCNC: 3.6 G/DL (ref 2–4)
GLUCOSE BLD STRIP.AUTO-MCNC: 159 MG/DL (ref 65–100)
GLUCOSE SERPL-MCNC: 160 MG/DL (ref 65–100)
GLUCOSE SERPL-MCNC: 84 MG/DL (ref 65–100)
GLUCOSE UR STRIP.AUTO-MCNC: NEGATIVE MG/DL
HCO3 BLDA-SCNC: 20 MMOL/L (ref 22–26)
HCT VFR BLD AUTO: 49.2 % (ref 36.6–50.3)
HGB BLD-MCNC: 16.9 G/DL (ref 12.1–17)
HGB UR QL STRIP: ABNORMAL
IMM GRANULOCYTES # BLD AUTO: 0.1 K/UL (ref 0–0.04)
IMM GRANULOCYTES NFR BLD AUTO: 1 % (ref 0–0.5)
KETONES UR QL STRIP.AUTO: NEGATIVE MG/DL
LACTATE SERPL-SCNC: 1.3 MMOL/L (ref 0.4–2)
LACTATE SERPL-SCNC: 4 MMOL/L (ref 0.4–2)
LEUKOCYTE ESTERASE UR QL STRIP.AUTO: NEGATIVE
LYMPHOCYTES # BLD: 2.9 K/UL (ref 0.8–3.5)
LYMPHOCYTES NFR BLD: 22 % (ref 12–49)
MAGNESIUM SERPL-MCNC: 2.3 MG/DL (ref 1.6–2.4)
MCH RBC QN AUTO: 32.4 PG (ref 26–34)
MCHC RBC AUTO-ENTMCNC: 34.3 G/DL (ref 30–36.5)
MCV RBC AUTO: 94.4 FL (ref 80–99)
METHADONE UR QL: NEGATIVE
MONOCYTES # BLD: 1 K/UL (ref 0–1)
MONOCYTES NFR BLD: 7 % (ref 5–13)
NEUTS SEG # BLD: 8.9 K/UL (ref 1.8–8)
NEUTS SEG NFR BLD: 67 % (ref 32–75)
NITRITE UR QL STRIP.AUTO: NEGATIVE
NRBC # BLD: 0 K/UL (ref 0–0.01)
NRBC BLD-RTO: 0 PER 100 WBC
OPIATES UR QL: NEGATIVE
PCO2 BLDA: 35 MMHG (ref 35–45)
PCP UR QL: NEGATIVE
PH BLDA: 7.38 [PH] (ref 7.35–7.45)
PH UR STRIP: 5.5 [PH] (ref 5–8)
PLATELET # BLD AUTO: 299 K/UL (ref 150–400)
PMV BLD AUTO: 10.5 FL (ref 8.9–12.9)
PO2 BLDA: 87 MMHG (ref 80–100)
POTASSIUM SERPL-SCNC: 3.3 MMOL/L (ref 3.5–5.1)
POTASSIUM SERPL-SCNC: 4.3 MMOL/L (ref 3.5–5.1)
PROT SERPL-MCNC: 7 G/DL (ref 6.4–8.2)
PROT UR STRIP-MCNC: NEGATIVE MG/DL
RBC # BLD AUTO: 5.21 M/UL (ref 4.1–5.7)
RBC #/AREA URNS HPF: ABNORMAL /HPF (ref 0–5)
SAO2 % BLD: 97 % (ref 92–97)
SAO2% DEVICE SAO2% SENSOR NAME: ABNORMAL
SERVICE CMNT-IMP: ABNORMAL
SODIUM SERPL-SCNC: 139 MMOL/L (ref 136–145)
SODIUM SERPL-SCNC: 144 MMOL/L (ref 136–145)
SP GR UR REFRACTOMETRY: 1.01 (ref 1–1.03)
SPECIMEN SITE: ABNORMAL
SPERM URNS QL MICRO: PRESENT
UA: UC IF INDICATED,UAUC: ABNORMAL
UROBILINOGEN UR QL STRIP.AUTO: 0.2 EU/DL (ref 0.2–1)
WBC # BLD AUTO: 13.1 K/UL (ref 4.1–11.1)
WBC URNS QL MICRO: ABNORMAL /HPF (ref 0–4)

## 2021-05-02 PROCEDURE — 95816 EEG AWAKE AND DROWSY: CPT | Performed by: PSYCHIATRY & NEUROLOGY

## 2021-05-02 PROCEDURE — 99218 HC RM OBSERVATION: CPT

## 2021-05-02 PROCEDURE — 36600 WITHDRAWAL OF ARTERIAL BLOOD: CPT

## 2021-05-02 PROCEDURE — 80183 DRUG SCRN QUANT OXCARBAZEPIN: CPT

## 2021-05-02 PROCEDURE — 99285 EMERGENCY DEPT VISIT HI MDM: CPT

## 2021-05-02 PROCEDURE — 80053 COMPREHEN METABOLIC PANEL: CPT

## 2021-05-02 PROCEDURE — 74011250637 HC RX REV CODE- 250/637: Performed by: STUDENT IN AN ORGANIZED HEALTH CARE EDUCATION/TRAINING PROGRAM

## 2021-05-02 PROCEDURE — 90471 IMMUNIZATION ADMIN: CPT

## 2021-05-02 PROCEDURE — 90715 TDAP VACCINE 7 YRS/> IM: CPT | Performed by: EMERGENCY MEDICINE

## 2021-05-02 PROCEDURE — 83735 ASSAY OF MAGNESIUM: CPT

## 2021-05-02 PROCEDURE — 81001 URINALYSIS AUTO W/SCOPE: CPT

## 2021-05-02 PROCEDURE — 95816 EEG AWAKE AND DROWSY: CPT | Performed by: STUDENT IN AN ORGANIZED HEALTH CARE EDUCATION/TRAINING PROGRAM

## 2021-05-02 PROCEDURE — 82077 ASSAY SPEC XCP UR&BREATH IA: CPT

## 2021-05-02 PROCEDURE — 80307 DRUG TEST PRSMV CHEM ANLYZR: CPT

## 2021-05-02 PROCEDURE — 77010033678 HC OXYGEN DAILY

## 2021-05-02 PROCEDURE — 82962 GLUCOSE BLOOD TEST: CPT

## 2021-05-02 PROCEDURE — 2709999900 HC NON-CHARGEABLE SUPPLY

## 2021-05-02 PROCEDURE — 74011000258 HC RX REV CODE- 258: Performed by: EMERGENCY MEDICINE

## 2021-05-02 PROCEDURE — 82803 BLOOD GASES ANY COMBINATION: CPT

## 2021-05-02 PROCEDURE — 96365 THER/PROPH/DIAG IV INF INIT: CPT

## 2021-05-02 PROCEDURE — 83605 ASSAY OF LACTIC ACID: CPT

## 2021-05-02 PROCEDURE — 36415 COLL VENOUS BLD VENIPUNCTURE: CPT

## 2021-05-02 PROCEDURE — 70450 CT HEAD/BRAIN W/O DYE: CPT

## 2021-05-02 PROCEDURE — 93005 ELECTROCARDIOGRAM TRACING: CPT

## 2021-05-02 PROCEDURE — 85025 COMPLETE CBC W/AUTO DIFF WBC: CPT

## 2021-05-02 PROCEDURE — 74011250636 HC RX REV CODE- 250/636: Performed by: EMERGENCY MEDICINE

## 2021-05-02 PROCEDURE — 70486 CT MAXILLOFACIAL W/O DYE: CPT

## 2021-05-02 PROCEDURE — 96361 HYDRATE IV INFUSION ADD-ON: CPT

## 2021-05-02 PROCEDURE — 74011250637 HC RX REV CODE- 250/637: Performed by: EMERGENCY MEDICINE

## 2021-05-02 PROCEDURE — 71045 X-RAY EXAM CHEST 1 VIEW: CPT

## 2021-05-02 PROCEDURE — 96375 TX/PRO/DX INJ NEW DRUG ADDON: CPT

## 2021-05-02 PROCEDURE — 74011250636 HC RX REV CODE- 250/636

## 2021-05-02 RX ORDER — ACETAMINOPHEN 325 MG/1
650 TABLET ORAL
Status: DISCONTINUED | OUTPATIENT
Start: 2021-05-02 | End: 2021-05-03 | Stop reason: HOSPADM

## 2021-05-02 RX ORDER — PROMETHAZINE HYDROCHLORIDE 25 MG/1
12.5 TABLET ORAL
Status: DISCONTINUED | OUTPATIENT
Start: 2021-05-02 | End: 2021-05-03 | Stop reason: HOSPADM

## 2021-05-02 RX ORDER — LEVETIRACETAM 500 MG/1
500 TABLET ORAL 2 TIMES DAILY
Status: DISCONTINUED | OUTPATIENT
Start: 2021-05-02 | End: 2021-05-03

## 2021-05-02 RX ORDER — BACITRACIN 500 [USP'U]/G
OINTMENT TOPICAL 3 TIMES DAILY
Qty: 1 TUBE | Refills: 0 | Status: SHIPPED | OUTPATIENT
Start: 2021-05-02 | End: 2021-05-12

## 2021-05-02 RX ORDER — LORAZEPAM 2 MG/ML
2 INJECTION INTRAMUSCULAR ONCE
Status: COMPLETED | OUTPATIENT
Start: 2021-05-02 | End: 2021-05-02

## 2021-05-02 RX ORDER — ACETAMINOPHEN 650 MG/1
650 SUPPOSITORY RECTAL
Status: DISCONTINUED | OUTPATIENT
Start: 2021-05-02 | End: 2021-05-03 | Stop reason: HOSPADM

## 2021-05-02 RX ORDER — OXCARBAZEPINE 150 MG/1
600 TABLET, FILM COATED ORAL EVERY 12 HOURS
Status: DISCONTINUED | OUTPATIENT
Start: 2021-05-02 | End: 2021-05-03 | Stop reason: HOSPADM

## 2021-05-02 RX ORDER — ONDANSETRON 2 MG/ML
4 INJECTION INTRAMUSCULAR; INTRAVENOUS
Status: DISCONTINUED | OUTPATIENT
Start: 2021-05-02 | End: 2021-05-03 | Stop reason: HOSPADM

## 2021-05-02 RX ORDER — POTASSIUM CHLORIDE 750 MG/1
40 TABLET, FILM COATED, EXTENDED RELEASE ORAL
Status: COMPLETED | OUTPATIENT
Start: 2021-05-02 | End: 2021-05-02

## 2021-05-02 RX ORDER — LORAZEPAM 2 MG/ML
INJECTION INTRAMUSCULAR
Status: COMPLETED
Start: 2021-05-02 | End: 2021-05-02

## 2021-05-02 RX ORDER — POLYETHYLENE GLYCOL 3350 17 G/17G
17 POWDER, FOR SOLUTION ORAL DAILY PRN
Status: DISCONTINUED | OUTPATIENT
Start: 2021-05-02 | End: 2021-05-03 | Stop reason: HOSPADM

## 2021-05-02 RX ORDER — SODIUM CHLORIDE 0.9 % (FLUSH) 0.9 %
5-40 SYRINGE (ML) INJECTION AS NEEDED
Status: DISCONTINUED | OUTPATIENT
Start: 2021-05-02 | End: 2021-05-03 | Stop reason: HOSPADM

## 2021-05-02 RX ORDER — KETOROLAC TROMETHAMINE 30 MG/ML
15 INJECTION, SOLUTION INTRAMUSCULAR; INTRAVENOUS
Status: COMPLETED | OUTPATIENT
Start: 2021-05-02 | End: 2021-05-02

## 2021-05-02 RX ORDER — GABAPENTIN 300 MG/1
300 CAPSULE ORAL 2 TIMES DAILY
Status: DISCONTINUED | OUTPATIENT
Start: 2021-05-02 | End: 2021-05-03 | Stop reason: HOSPADM

## 2021-05-02 RX ORDER — SODIUM CHLORIDE 0.9 % (FLUSH) 0.9 %
5-40 SYRINGE (ML) INJECTION EVERY 8 HOURS
Status: DISCONTINUED | OUTPATIENT
Start: 2021-05-02 | End: 2021-05-03 | Stop reason: HOSPADM

## 2021-05-02 RX ORDER — LORAZEPAM 2 MG/ML
1 INJECTION INTRAMUSCULAR
Status: DISCONTINUED | OUTPATIENT
Start: 2021-05-02 | End: 2021-05-03 | Stop reason: HOSPADM

## 2021-05-02 RX ORDER — BUTALBITAL, ACETAMINOPHEN AND CAFFEINE 50; 325; 40 MG/1; MG/1; MG/1
1 TABLET ORAL
Qty: 20 TAB | Refills: 0 | Status: SHIPPED
Start: 2021-05-02 | End: 2021-05-03

## 2021-05-02 RX ADMIN — KETOROLAC TROMETHAMINE 15 MG: 30 INJECTION, SOLUTION INTRAMUSCULAR; INTRAVENOUS at 01:29

## 2021-05-02 RX ADMIN — SODIUM CHLORIDE 1000 ML: 9 INJECTION, SOLUTION INTRAVENOUS at 02:23

## 2021-05-02 RX ADMIN — TETANUS TOXOID, REDUCED DIPHTHERIA TOXOID AND ACELLULAR PERTUSSIS VACCINE, ADSORBED 0.5 ML: 5; 2.5; 8; 8; 2.5 SUSPENSION INTRAMUSCULAR at 01:31

## 2021-05-02 RX ADMIN — LORAZEPAM 2 MG: 2 INJECTION INTRAMUSCULAR; INTRAVENOUS at 03:23

## 2021-05-02 RX ADMIN — Medication 10 ML: at 13:31

## 2021-05-02 RX ADMIN — OXCARBAZEPINE 600 MG: 150 TABLET, FILM COATED ORAL at 09:01

## 2021-05-02 RX ADMIN — LORAZEPAM 2 MG: 2 INJECTION INTRAMUSCULAR at 03:23

## 2021-05-02 RX ADMIN — GABAPENTIN 300 MG: 300 CAPSULE ORAL at 22:14

## 2021-05-02 RX ADMIN — SODIUM CHLORIDE 1000 ML: 9 INJECTION, SOLUTION INTRAVENOUS at 01:29

## 2021-05-02 RX ADMIN — SODIUM CHLORIDE 1000 MG: 900 INJECTION, SOLUTION INTRAVENOUS at 03:52

## 2021-05-02 RX ADMIN — POTASSIUM CHLORIDE 40 MEQ: 750 TABLET, EXTENDED RELEASE ORAL at 01:53

## 2021-05-02 RX ADMIN — Medication 10 ML: at 08:17

## 2021-05-02 RX ADMIN — Medication 10 ML: at 22:46

## 2021-05-02 RX ADMIN — OXCARBAZEPINE 600 MG: 150 TABLET, FILM COATED ORAL at 22:14

## 2021-05-02 NOTE — PROGRESS NOTES
Floor Rounds:      Breakthrough seizure - trileptal level pending  Partial complex seizure  Metabolic acidosis secondary to lactic acidosis  NAZANIN    H Review:   Anticoagulation: SCDs Acid suppressant(s):n/a Nutrition: reg     Current or PTA Respiratory Medications: n/a  Inhaler for suspected or confirmed COVID-19?:n/a   Renal/  hepatic CrCl: Estimated Creatinine Clearance: 129.9 mL/min (based on SCr of 1 mg/dL). SCr:   Lab Results   Component Value Date/Time    Creatinine (POC) 1.1 05/09/2017 08:09 PM    Creatinine 1.00 05/02/2021 09:15 AM     Renally Adjusted Medications: n/a   Cardiac   Cardiac Drips Discontinued: n/a  Cardiac history (ex. CVA/TIA, SHF):n/a  If yes, meds & control: n/a  QTc: 467 (5/2)   Pain Control/Bowel Regimen APAP  miralax   ID  Antibiotics listed:   n/a       Coverage Appropriate? Double Coverage? Duration of therapy:   Anti-MRSA or echinocandin coverage appropriate? C. diff? COVID-19?  Not tested   Endocrine Diabetes coverage? n  Thyroid coverage?n   Psychiatry Psychiatric Medication:  n/a  UDS: neg   Electrolytes & other lab results Lactic acid 4.0>1.3  SCr 1.26>1.0  Trileptal level pending   Home Meds Pharmacy Med Rec: Y  Medications Resumed: PTA oxcarbazepine;    Miscellaneous Notes      Core Measures  CVA/TIA: Antiplatelet (by end of hospital day 2 and on discharge; wait 24 hours if given alteplase) + statin (high intensity i.e. atorvastatin 40-80 mg) +  (Anticoagulation for A-Fib)  Heart Failure: Beta-blocker, ACE/ARB (LVEF < 40%)
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
## 2021-05-02 NOTE — ED NOTES
Patient agreed to full work up. Patient cooperative but continues to seem agitated with triage questions, patient stated \"get off my balls\" when this RN questioned the patient, patient replied \"get off my balls with all of the questions. \" Informed patient of the importance of triage questions in order to properly treat him. Vitals assessed, 20g IV placed in R AC, labs obtained, and EKG done. Patient reports hx of Epilepsy, last dose of Trileptal yesterday per patient. Patient reports last seizure was approximately one year ago. On assessment, ecchymosis noted to bilateral sides of tongue and abrasion noted to L side of forehead. Patient now denies drug/etoh use tonight. Patient reports he was at a friends house, patient states he doesn't remember going outside or why he was walking down the street. Patient unable to recall where EMS picked him up before transporting him to ED.
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
## 2021-05-02 NOTE — PROGRESS NOTES
TRANSFER - IN REPORT:    Verbal report received from [facility] RN(name) on Inez Guerrero  being received from ER(unit) for routine progression of care      Report consisted of patients Situation, Background, Assessment and   Recommendations(SBAR). Information from the following report(s) SBAR< results, ED summary, MAR, intake and output was reviewed with the receiving nurse. Opportunity for questions and clarification was provided. Assessment completed upon patients arrival to unit and care assumed.

## 2021-05-02 NOTE — ACP (ADVANCE CARE PLANNING)
Patient understands advance directive discussion and wants CPR and ventilation if needed. He states his mother, Clyda Hodgkin ([phone]) as his emergency contact and next of kin.      Raymond Eng RN/SHAHBAZ  [phone]

## 2021-05-02 NOTE — PROGRESS NOTES
PROCEDURE: ROUTINE INPATIENT EEG  NAME:   Fab Rojo  EEG NUMBER: [id number]  ACCOUNT NUMBER : [de-identified]  MRN:   [id number]  DATE OF SERVICE: 5/2/2021     HISTORY/INDICATION: Seizure   MEDICATIONS: See chart  CONDITIONS OF RECORDING: This is a routine 21-channel EEG recording performed in accordance with the international 10-20 system with one channel devoted to limited EKG. This study was done during states of wakefulness. Photic stimulation, no hyperventilation was performed as activating procedures. In addition, eye movement was recorded    DESCRIPTION:   Upon maximal arousal the posterior dominant rhythm has a frequency of 9-11 hz with an amplitude of 15 uV. This activity is symmetric over the bilateral posterior derivations and attenuates with eye opening. Photic stimulation and hyperventilation do not significantly alter the tracing. The patient becomes drowsy, but deeper stages of sleep are not attained . Normal sleep architecture is seen with stage II sleep recognized by the presence of symmetric vertex waves and sleep spindles. There are no focal abnormalities, epileptiform discharges, or electrographic seizures seen. Noted was occasional slowing in the theta range and muscle artifacts. INTERPRETATION:   The EEG revealed occasional nonlateralized slowing without overt epileptic activity or significant asymmetry. This may represent postictal state otherwise unremarkable    CLINICAL CORRELATION: A normal EEG does not definitively exclude a diagnosis of epilepsy if clinical suspicion is high consider sleep deprived EEG.      Frankey Berber, MD

## 2021-05-02 NOTE — PROGRESS NOTES
Bedside and Verbal shift change report given to [facility] (oncoming nurse) by Anna Shi (offgoing nurse). Report included the following information SBAR, Kardex, Intake/Output, MAR, Accordion, Recent Results, Med Rec Status and Cardiac Rhythm NSR.pt sleeping at this time. seziure pad on,bed alarm on for safety. call bell in reach. pt on O2 6 L,sat 96%,O2 decrease to 4 L,sat remain 96%. 0820 Sat 96%,O2 decrease to 2 L,Sat 95%the patient assessed and continue care assumed. pt sleepy,open his eyes to verbal command. 0915 Blood drawn and sent to lab.    0930 pt voided 450 clear urine. Urine specimen collected and sent to lab.pt ambulated to [facility] with steady gait. pt has BM. Pt on room air,sat 97%the patient eating breakfast at this time. 1100 pt sleeping at this time. 1200 EEG tech at bedside performing EEG. 1310 EEG completed,pt is in the Bathroom. 1330 pt sitting on chair eating lunch.

## 2021-05-02 NOTE — ED NOTES
Patient seemed to attempt to open bathroom door, fell to the floor and began seizing. Patient was turned to L side and head protected. Chidi King RN Charge Nurse went to override Ativan.

## 2021-05-02 NOTE — ED NOTES
Patient denied need for Trileptal medication refill. Patient reports he recently picked up a 90 day refill from his pharmacy. Patient resting quietly in bed with call bell/personal belongings within reach, and safety/seizure precautions in place. No signs of acute distress noted at this time. Offered patient food/beverage and opportunity to use restroom, patient declined. Will continue to monitor.

## 2021-05-02 NOTE — H&P
Hospitalist Admission Note    NAME: Calvin Eye   :  1985   MRN:  [id number]   Room Number: Brian Delaney  @ [facility]     Date/Time:  2021 8:20 AM    Patient PCP: Keren Romero, DO  ______________________________________________________________________  Given the patient's current clinical presentation, I have a high level of concern for decompensation if discharged from the emergency department. Complex decision making was performed, which includes reviewing the patient's available past medical records, laboratory results, and x-ray films. My assessment of this patient's clinical condition and my plan of care is as follows. Assessment / Plan: Active Problems:    Seizure, partial ([address] (2021)      #Breakthrough seizure POA ( patient ran out of AED)   #Partial complex seizures with secondary generalization POA  -CT scan of head unremarkable for acute changes  -CT maxillofacial unremarkable for any acute changes  -Patient has known history of seizures and takes Trileptal 600 mg twice a day and follows neurology as an outpatient  -Trileptal level pending  -Keppra load given in the ED  -Resume Trileptal  -EEG  -MRI 2020 normal MRI of brain normal MRI evaluation of temporal lobes   - Neurology consult   -Seizure precautions    #High anion gap metabolic acidosis secondary lactic acidosis due to seizure activity POA  -Anion gap 21 with bicarb 15 lactic acid 4  -Anion gap reduced to 1.3 recheck BMP this morning    #Acute kidney injury  -Serum creatinine 1.26 baseline 0.78  -Ins and outs  -Avoid nephrotoxic medication  -Renal dose medication      # Tobacco abuse   - 2 pack a day   -Patient was counseled extensively on the need to abstain from tobacco, its addictive tendencies, its deleterious effects on the lungs, cardiovascular  as well as its financial & social sequelae    #Obesity  - BMI 35      Body mass index is 35.05 kg/m².   Code Status: Full   Surrogate Decision Maker:    DVT Prophylaxis: Lovenox  GI Prophylaxis: not indicated          Subjective:   CHIEF COMPLAINT: Breakthrough seizures     HISTORY OF PRESENT ILLNESS:     Boby Sandra is a 39 y.o.  male with PMH of  Above mentioned problems  who presents to ED  By EMS for found down. Patient has a hx of seizure d/o and was out of his AED for past couple of days. Patient states he has an appointment coming up in two weeks. Patient denied any chest pain, SOB, belly pain or any headache. Endorsed 2 pack a day since 5years of age   [de-identified] Drug use / etoh use     We were asked to admit for work up and evaluation of the above problems. Past Medical History:   Diagnosis Date    Anxiety disorder     Arthritis     Rheumatoid     Depression     Bipolar    Unspecified epilepsy without mention of intractable epilepsy     has epilepsy since age 7 mo; most recent 4mo ago        Past Surgical History:   Procedure Laterality Date    HX APPENDECTOMY      as child    HX ORTHOPAEDIC Right     Right hand fracture surgically repaired/Right foot surgery    HX ORTHOPAEDIC      heel spurs       Social History     Tobacco Use    Smoking status: Current Every Day Smoker     Packs/day: 1.00    Smokeless tobacco: Never Used    Tobacco comment: [id number]   Substance Use Topics    Alcohol use: No        Family History   Problem Relation Age of Onset    Cancer Mother         breast    No Known Problems Father      No Known Allergies     Prior to Admission medications    Medication Sig Start Date End Date Taking? Authorizing Provider   butalbital-acetaminophen-caffeine (FIORICET, ESGIC) -40 mg per tablet Take 1 Tab by mouth every six (6) hours as needed for Headache. 5/2/21  Yes Seth Sandhoff, MD   bacitracin (BACITRACIN) 500 unit/gram oint Apply  to affected area three (3) times daily for 10 days.  Apply to affected area 5/2/21 5/12/21 Yes Seth Sandhoff, MD   OXcarbazepine (TRILEPTAL) 600 mg tablet Take 1 Tab by mouth two (2) times a day. 4/28/21  Yes Delmy Bro MD   gabapentin (NEURONTIN) 300 mg capsule Take 1 Cap by mouth two (2) times a day. Max Daily Amount: 600 mg. 1/20/21  Yes Arina Rivas NP   nabumetone (RELAFEN) 500 mg tablet TAKE 1 TABLET BY MOUTH TWICE DAILY AS NEEDED FOR PAIN 4/29/21   Delmy Bro MD   pyridoxine, vitamin B6, (VITAMIN B-6) 50 mg tablet Take 1 Tab by mouth daily. Indications: low levels of vitamin B6 in the blood 11/12/20   Arina Rivas NP   Cholecalciferol, Vitamin D3, (VITAMIN D3) 1,000 unit cap Take  by mouth. Provider, Historical   b complex vitamins (B COMPLEX 1) tablet Take 1 Tab by mouth daily. Provider, Historical       REVIEW OF SYSTEMS:     I am not able to complete the review of systems because:    The patient is intubated and sedated    The patient has altered mental status due to his acute medical problems    The patient has baseline aphasia from prior stroke(s)    The patient has baseline dementia and is not reliable historian    The patient is in acute medical distress and unable to provide information           Total of 12 systems reviewed as follows:       POSITIVE= underlined text  Negative = text not underlined  General:  fever, chills, sweats, generalized weakness, weight loss/gain,      loss of appetite   Eyes:    blurred vision, eye pain, loss of vision, double vision  ENT:    rhinorrhea, pharyngitis   Respiratory:   cough, sputum production, SOB, ORTIZ, wheezing, pleuritic pain   Cardiology:   chest pain, palpitations, orthopnea, PND, edema, syncope   Gastrointestinal:  abdominal pain , N/V, diarrhea, dysphagia, constipation, bleeding   Genitourinary:  frequency, urgency, dysuria, hematuria, incontinence   Muskuloskeletal :  arthralgia, myalgia, back pain  Hematology:  easy bruising, nose or gum bleeding, lymphadenopathy   Dermatological: rash, ulceration, pruritis, color change / jaundice  Endocrine:   hot flashes or polydipsia   Neurological:  headache, dizziness, confusion, focal weakness, paresthesia,     Speech difficulties, memory loss, gait difficulty  Psychological: Feelings of anxiety, depression, agitation    Objective:   VITALS:    Visit Vitals  /65 (BP 1 Location: Left upper arm, BP Patient Position: At rest)   Pulse 82   Temp 98.9 °F (37.2 °C)   Resp 17   Ht 5' 11\" (1.803 m)   Wt 114 kg (251 lb 4.8 oz)   SpO2 96%   BMI 35.05 kg/m²       PHYSICAL EXAM:    General:    Alert, cooperative, no distress, appears stated age. HEENT: Atraumatic, anicteric sclerae, pink conjunctivae     No oral ulcers, mucosa moist, throat clear, dentition fair  Neck:  Supple, symmetrical,  thyroid: non tender  Lungs:   Clear to auscultation bilaterally. No Wheezing or Rhonchi. No rales. Chest wall:  No tenderness  No Accessory muscle use. Heart:   Regular  rhythm,  No  murmur   No edema  Abdomen:   Soft, non-tender. Not distended. Bowel sounds normal  Extremities: No cyanosis. No clubbing,      Skin turgor normal, Capillary refill normal, Radial dial pulse 2+  Skin:     Not pale. Not Jaundiced  No rashes   Psych:  Good insight. Not depressed. Not anxious or agitated. Neurologic: EOMs intact. No facial asymmetry. No aphasia or slurred speech. Symmetrical strength, Sensation grossly intact.  Alert and oriented X 4.     ______________________________________________________________________    Care Plan discussed with:  Patient/Family    Expected  Disposition:  Home w/Family  ________________________________________________________________________  TOTAL TIME:  25 Minutes    Critical Care Provided     Minutes non procedure based      Comments    x Reviewed previous records   >50% of visit spent in counseling and coordination of care x Discussion with patient and/or family and questions answered       ________________________________________________________________________  Signed: Jose Francisco Nunez MD    Procedures: see electronic medical records for all procedures/Xrays and details which were not copied into this note but were reviewed prior to creation of Plan. LAB DATA REVIEWED:    Recent Results (from the past 24 hour(s))   CBC WITH AUTOMATED DIFF    Collection Time: 05/02/21 12:51 AM   Result Value Ref Range    WBC 13.1 (H) 4.1 - 11.1 K/uL    RBC 5.21 4.10 - 5.70 M/uL    HGB 16.9 12.1 - 17.0 g/dL    HCT 49.2 36.6 - 50.3 %    MCV 94.4 80.0 - 99.0 FL    MCH 32.4 26.0 - 34.0 PG    MCHC 34.3 30.0 - 36.5 g/dL    RDW 12.4 11.5 - 14.5 %    PLATELET 667 534 - 414 K/uL    MPV 10.5 8.9 - 12.9 FL    NRBC 0.0 0  WBC    ABSOLUTE NRBC 0.00 0.00 - 0.01 K/uL    NEUTROPHILS 67 32 - 75 %    LYMPHOCYTES 22 12 - 49 %    MONOCYTES 7 5 - 13 %    EOSINOPHILS 2 0 - 7 %    BASOPHILS 1 0 - 1 %    IMMATURE GRANULOCYTES 1 (H) 0.0 - 0.5 %    ABS. NEUTROPHILS 8.9 (H) 1.8 - 8.0 K/UL    ABS. LYMPHOCYTES 2.9 0.8 - 3.5 K/UL    ABS. MONOCYTES 1.0 0.0 - 1.0 K/UL    ABS. EOSINOPHILS 0.2 0.0 - 0.4 K/UL    ABS. BASOPHILS 0.1 0.0 - 0.1 K/UL    ABS. IMM. GRANS. 0.1 (H) 0.00 - 0.04 K/UL    DF AUTOMATED     METABOLIC PANEL, COMPREHENSIVE    Collection Time: 05/02/21 12:51 AM   Result Value Ref Range    Sodium 139 136 - 145 mmol/L    Potassium 3.3 (L) 3.5 - 5.1 mmol/L    Chloride 103 97 - 108 mmol/L    CO2 15 (LL) 21 - 32 mmol/L    Anion gap 21 (H) 5 - 15 mmol/L    Glucose 160 (H) 65 - 100 mg/dL    BUN 10 6 - 20 MG/DL    Creatinine 1.26 0.70 - 1.30 MG/DL    BUN/Creatinine ratio 8 (L) 12 - 20      GFR est AA >60 >60 ml/min/1.73m2    GFR est non-AA >60 >60 ml/min/1.73m2    Calcium 8.7 8.5 - 10.1 MG/DL    Bilirubin, total 0.4 0.2 - 1.0 MG/DL    ALT (SGPT) 20 12 - 78 U/L    AST (SGOT) 17 15 - 37 U/L    Alk.  phosphatase 92 45 - 117 U/L    Protein, total 7.0 6.4 - 8.2 g/dL    Albumin 3.4 (L) 3.5 - 5.0 g/dL    Globulin 3.6 2.0 - 4.0 g/dL    A-G Ratio 0.9 (L) 1.1 - 2.2     ETHYL ALCOHOL    Collection Time: 05/02/21 12:51 AM   Result Value Ref Range ALCOHOL(ETHYL),SERUM <10 <10 MG/DL   MAGNESIUM    Collection Time: 05/02/21 12:51 AM   Result Value Ref Range    Magnesium 2.3 1.6 - 2.4 mg/dL   EKG, 12 LEAD, INITIAL    Collection Time: 05/02/21  1:04 AM   Result Value Ref Range    Ventricular Rate 91 BPM    Atrial Rate 91 BPM    P-R Interval 138 ms    QRS Duration 96 ms    Q-T Interval 380 ms    QTC Calculation (Bezet) 467 ms    Calculated P Axis 32 degrees    Calculated R Axis 30 degrees    Calculated T Axis 40 degrees    Diagnosis       Normal sinus rhythm  Normal ECG  When compared with ECG of 09-MAY-2017 19:48,  No significant change was found     GLUCOSE, POC    Collection Time: 05/02/21  1:07 AM   Result Value Ref Range    Glucose (POC) 159 (H) 65 - 100 mg/dL    Performed by Sharie Brittle RN    LACTIC ACID    Collection Time: 05/02/21  1:40 AM   Result Value Ref Range    Lactic acid 4.0 (HH) 0.4 - 2.0 MMOL/L   BLOOD GAS, ARTERIAL    Collection Time: 05/02/21  1:45 AM   Result Value Ref Range    pH 7.38 7.35 - 7.45      PCO2 35 35 - 45 mmHg    PO2 87 80 - 100 mmHg    O2 SAT 97 92 - 97 %    BICARBONATE 20 (L) 22 - 26 mmol/L    BASE DEFICIT 4.3 mmol/L    O2 METHOD ROOM AIR      SPONTANEOUS RATE 18      Sample source ARTERIAL      SITE RIGHT RADIAL      FLOR'S TEST YES     LACTIC ACID    Collection Time: 05/02/21  3:00 AM   Result Value Ref Range    Lactic acid 1.3 0.4 - 2.0 MMOL/L   DRUG SCREEN, URINE    Collection Time: 05/02/21  3:49 AM   Result Value Ref Range    AMPHETAMINES Negative NEG      BARBITURATES Negative NEG      BENZODIAZEPINES Negative NEG      COCAINE Negative NEG      METHADONE Negative NEG      OPIATES Negative NEG      PCP(PHENCYCLIDINE) Negative NEG      THC (TH-CANNABINOL) Negative NEG      Drug screen comment (NOTE)

## 2021-05-02 NOTE — ED PROVIDER NOTES
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 decision-making, bedside attention and documentation. During this entire length of time I was immediately available to the patient. This does not include time spent on separately reported billable procedures. Critical Care: The reason for providing this level of medical care for this critically-ill patient was due to a critical illness that impaired one or more vital organ systems, such that there was a high probability of imminent or life-threatening deterioration in the patient's condition. This care involved the highest level of preparedness to intervene urgently. This care involved high complexity decision making to assess, manipulate, and support vital system functions, to treat this degree of vital organ system failure, and to prevent further life threatening deterioration of the patients condition requiring frequent assessments and interventions.     Gabriel Mtz MD    Disposition:   ADMIT  Given the patient's current clinical presentation, I have a high level of concern for decompensation if discharged from the emergency department. Patient is being admitted to the hospital.  The results of their tests and reasons for their admission have been discussed with them and/or available family. They convey agreement and understanding for the need to be admitted and for their admission diagnosis. Consultation has been made with the inpatient physician specialist for hospitalization. Diagnosis   Clinical Impression:  1. Recurrent seizures ([address]    2. Breakthrough seizure ([address]    3. High anion gap metabolic acidosis    4. Lactic acidosis    5. Abrasion of face, initial encounter    6. Acute hypokalemia        Attestation:  Lili Garzon MD, am the attending of record for this patient. I personally performed the services described in this documentation on this date, 5/2/2021 for patient, Yokasta Wahl. I have reviewed the chart and verified that the record is accurate and complete.
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
## 2021-05-02 NOTE — PROGRESS NOTES
Problem: Falls - Risk of  Goal: *Absence of Falls  Description: Document Phillip Chloride Fall Risk and appropriate interventions in the flowsheet.   Outcome: Progressing Towards Goal  Note: Fall Risk Interventions:       Mentation Interventions: Adequate sleep, hydration, pain control    Medication Interventions: Bed/chair exit alarm, Evaluate medications/consider consulting pharmacy         History of Falls Interventions: Bed/chair exit alarm         Problem: Patient Education: Go to Patient Education Activity  Goal: Patient/Family Education  Outcome: Progressing Towards Goal

## 2021-05-02 NOTE — PROGRESS NOTES
[facility] Admission Pharmacy Medication Reconciliation    Information obtained from:   Patient interview, [facility] , RX Query  RxQuery data available1: yes    Comments/recommendations:    1) The patient was interviewed regarding current PTA medication list, use and drug allergies. The patient was questioned regarding use of any other inhalers, topical products, over the counter medications, herbal medications, vitamin products or ophthalmic/nasal/otic medication use. 2) Medication changes to PTA list:    Added   none  Removed   B complex tab   Vit D3 cap   Relafen (nabumetone) 500 mg BID prn pain   Vit B6 tab  Adjusted   Gabapentin 300 mg po BID to PRN - patient stated he takes PRN    3) The [place] Prescription Monitoring Program () was accessed to determine fill history of any controlled medications:   4/14/21 Gabapentin 300 mg po BID # 60 (30-day supply)       1RxQuery pharmacy benefit data reflects medications filled and processed through the patient's insurance, however                this data does NOT capture whether the medication was picked up or is currently being taken by the patient. Past Medical History/Disease States:  Past Medical History:   Diagnosis Date    Anxiety disorder     Arthritis     Rheumatoid     Depression     Bipolar    Unspecified epilepsy without mention of intractable epilepsy     has epilepsy since age 7 mo; most recent 4mo ago         Patient allergies: Allergies as of 05/02/2021    (No Known Allergies)         Prior to Admission Medications   Prescriptions Last Dose Informant  Taking? OXcarbazepine (TRILEPTAL) 600 mg tablet 5/1/2021 at Unknown time   Yes   Sig: Take 1 Tab by mouth two (2) times a day.   gabapentin (NEURONTIN) 300 mg capsule 5/1/2021 at Unknown time   Yes   Sig: Take 1 Cap by mouth two (2) times a day. Max Daily Amount: 600 mg.                 Thank you,  Elijah Mendez, [facility]

## 2021-05-02 NOTE — PROGRESS NOTES
1510 Report given to Alonzo Leon RN from [address] [place], [place] [address]rs Ruben, RN that included SBAR, MAR, Kardex, Cardiac monitoring, Recent Results. 1515 Pt sleeping quietly in bed    1602 VS stable, reassessment complete. Pt stated no pain. Pt resting quietly in bed. No requests at this time. 1715 Pt resting quietly in bed    1744 Pt sleeping in bed. No signs of distress     1816 Pt sleeping in bed    Bedside shift report [facility] that included SBAR, MAR, Kardex, Cardiac monitoring, Recent Results.

## 2021-05-02 NOTE — PROGRESS NOTES
0510 pt arrived to room PCU 4 via stretcher on 6 l oxygen via nasal cannula. Pt very drowsy and place din bed. Too drowsy to complete admission data base or answer any questions at this time. Belongings arrived with pt. Clothing remains on pt at this time, again too drowsy to move around. SCDs placed on pt per order. Oxygen placed on 4 l and pt sats dropped to 85. Returned immediately to 6l.nc and sats returned to high 90s . Pt arouses to voice but goes right back to sleep. Vital signs stable at this time. Dual skin check deferred until pt more responsive. Normal sinus rhythm on the monitor. Seizure precautions in place, rails padded, suction in the room and working properly, bed alarm activated. 0720 Report given to Heladio LUX using SBAR, MAR and results. Pt has SCDS in place. Pt continues to rest quietly , no signs of distress.

## 2021-05-02 NOTE — ED NOTES
TRANSFER - OUT REPORT:    Verbal report given to [facility]. on Jasiel Duffy  being transferred to PCU for change in patient condition(Seizure)       Report consisted of patients Situation, Background, Assessment and   Recommendations(SBAR). Information from the following report(s) SBAR, ED Summary, Intake/Output, MAR, Recent Results, Med Rec Status and Cardiac Rhythm NSR was reviewed with the receiving nurse. Lines:   Peripheral IV 05/02/21 Right Antecubital (Active)   Site Assessment Clean, dry, & intact 05/02/21 0050   Phlebitis Assessment 0 05/02/21 0050   Infiltration Assessment 0 05/02/21 0050   Dressing Status Clean, dry, & intact 05/02/21 0050        Opportunity for questions and clarification was provided.       Patient transported with:   Registered Nurse

## 2021-05-03 ENCOUNTER — TELEPHONE (OUTPATIENT)
Dept: INTERNAL MEDICINE CLINIC | Age: 36
End: 2021-05-03

## 2021-05-03 VITALS
HEART RATE: 67 BPM | HEIGHT: 71 IN | RESPIRATION RATE: 16 BRPM | BODY MASS INDEX: 35.27 KG/M2 | TEMPERATURE: 98.8 F | DIASTOLIC BLOOD PRESSURE: 61 MMHG | WEIGHT: 251.9 LBS | OXYGEN SATURATION: 98 % | SYSTOLIC BLOOD PRESSURE: 107 MMHG

## 2021-05-03 LAB
ATRIAL RATE: 91 BPM
BASOPHILS # BLD: 0.1 K/UL (ref 0–0.1)
BASOPHILS NFR BLD: 1 % (ref 0–1)
CALCULATED P AXIS, ECG09: 32 DEGREES
CALCULATED R AXIS, ECG10: 30 DEGREES
CALCULATED T AXIS, ECG11: 40 DEGREES
DIAGNOSIS, 93000: NORMAL
DIFFERENTIAL METHOD BLD: NORMAL
EOSINOPHIL # BLD: 0.3 K/UL (ref 0–0.4)
EOSINOPHIL NFR BLD: 3 % (ref 0–7)
ERYTHROCYTE [DISTWIDTH] IN BLOOD BY AUTOMATED COUNT: 13 % (ref 11.5–14.5)
HCT VFR BLD AUTO: 43.3 % (ref 36.6–50.3)
HGB BLD-MCNC: 14.8 G/DL (ref 12.1–17)
IMM GRANULOCYTES # BLD AUTO: 0 K/UL (ref 0–0.04)
IMM GRANULOCYTES NFR BLD AUTO: 0 % (ref 0–0.5)
LYMPHOCYTES # BLD: 3.1 K/UL (ref 0.8–3.5)
LYMPHOCYTES NFR BLD: 32 % (ref 12–49)
MCH RBC QN AUTO: 32.3 PG (ref 26–34)
MCHC RBC AUTO-ENTMCNC: 34.2 G/DL (ref 30–36.5)
MCV RBC AUTO: 94.5 FL (ref 80–99)
MONOCYTES # BLD: 1 K/UL (ref 0–1)
MONOCYTES NFR BLD: 10 % (ref 5–13)
NEUTS SEG # BLD: 5.2 K/UL (ref 1.8–8)
NEUTS SEG NFR BLD: 54 % (ref 32–75)
NRBC # BLD: 0 K/UL (ref 0–0.01)
NRBC BLD-RTO: 0 PER 100 WBC
P-R INTERVAL, ECG05: 138 MS
PLATELET # BLD AUTO: 244 K/UL (ref 150–400)
PMV BLD AUTO: 10.2 FL (ref 8.9–12.9)
Q-T INTERVAL, ECG07: 380 MS
QRS DURATION, ECG06: 96 MS
QTC CALCULATION (BEZET), ECG08: 467 MS
RBC # BLD AUTO: 4.58 M/UL (ref 4.1–5.7)
VENTRICULAR RATE, ECG03: 91 BPM
WBC # BLD AUTO: 9.7 K/UL (ref 4.1–11.1)

## 2021-05-03 PROCEDURE — 85025 COMPLETE CBC W/AUTO DIFF WBC: CPT

## 2021-05-03 PROCEDURE — 36415 COLL VENOUS BLD VENIPUNCTURE: CPT

## 2021-05-03 PROCEDURE — 74011250637 HC RX REV CODE- 250/637: Performed by: STUDENT IN AN ORGANIZED HEALTH CARE EDUCATION/TRAINING PROGRAM

## 2021-05-03 PROCEDURE — 99218 HC RM OBSERVATION: CPT

## 2021-05-03 RX ORDER — OXCARBAZEPINE 600 MG/1
600 TABLET, FILM COATED ORAL 2 TIMES DAILY
Qty: 60 TAB | Refills: 3 | Status: SHIPPED | OUTPATIENT
Start: 2021-05-03 | End: 2021-09-09 | Stop reason: SDUPTHER

## 2021-05-03 RX ORDER — GABAPENTIN 300 MG/1
300 CAPSULE ORAL 2 TIMES DAILY
Qty: 60 CAP | Refills: 2 | Status: SHIPPED | OUTPATIENT
Start: 2021-05-03 | End: 2021-12-09 | Stop reason: SDUPTHER

## 2021-05-03 RX ADMIN — GABAPENTIN 300 MG: 300 CAPSULE ORAL at 08:20

## 2021-05-03 RX ADMIN — Medication 10 ML: at 04:47

## 2021-05-03 RX ADMIN — OXCARBAZEPINE 600 MG: 150 TABLET, FILM COATED ORAL at 08:20

## 2021-05-03 NOTE — PROGRESS NOTES
Problem: Falls - Risk of  Goal: *Absence of Falls  Description: Document Amarilys Beltre Fall Risk and appropriate interventions in the flowsheet.   5/3/2021 1514 by Naye Reyna  Outcome: Resolved/Met  Note: Fall Risk Interventions:       Mentation Interventions: Adequate sleep, hydration, pain control, Bed/chair exit alarm, More frequent rounding, Toileting rounds, Update white board, Room close to nurse's station    Medication Interventions: Bed/chair exit alarm, Patient to call before getting OOB, Teach patient to arise slowly         History of Falls Interventions: Bed/chair exit alarm, Consult care management for discharge planning, Door open when patient unattended, Room close to nurse's station, Investigate reason for fall      5/3/2021 0859 by Naye Reyna  Outcome: Progressing Towards Goal  Note: Fall Risk Interventions:       Mentation Interventions: Adequate sleep, hydration, pain control, Door open when patient unattended, Familiar objects from home    Medication Interventions: Bed/chair exit alarm, Patient to call before getting OOB         History of Falls Interventions: Bed/chair exit alarm, Door open when patient unattended         Problem: Patient Education: Go to Patient Education Activity  Goal: Patient/Family Education  Outcome: Resolved/Met     Problem: Seizure Disorder (Adult)  Goal: *STG: Remains free of seizure activity  5/3/2021 1514 by Naye Reyna  Outcome: Resolved/Met  5/3/2021 0859 by Naye Reyna  Outcome: Progressing Towards Goal  Goal: *STG: Maintains lab values within therapeutic range  Outcome: Resolved/Met  Goal: *STG/LTG: Complies with medication therapy  Outcome: Resolved/Met  Goal: *STG: Remains free of injury during seizure activity  Outcome: Resolved/Met  Goal: *STG: Remains safe in hospital  Outcome: Resolved/Met  Goal: Interventions  Outcome: Resolved/Met     Problem: Patient Education: Go to Patient Education Activity  Goal: Patient/Family Education  Outcome: Resolved/Met

## 2021-05-03 NOTE — PROGRESS NOTES
DEE DEE  Readmission score-Low    IDT met to discuss plan of care. Patient ready for discharge today. Will need follow-up PCP and neurology appointments. CM called PCP office for appointment. Dr. Ismael Murray office stated that his schedule is booked until July. Office will send message to nursing stating that patient has discharged from hospital and will need an appointment in next week. CM called Dr. Francis Kay (neurology) and post hospitalization appointment has been scheduled for 5/14 at 8am.    CM met with patient to discuss discharge instructions. Patient stated that he will call for a ride home. Discussed appointments and the need to call his PCP to verify appointment. Call Professor Ahn 108, DO on 5/5/2021   Specialty: Internal Medicine   [facility]   [phone]   Please call office by 12 noon on Wednesday, 5/5 to verify post-hospitalization appointment. Follow up with Nehal Vizcarra MD on 5/14/2021   Specialty: Neurology   [address]   [phone]   Your appointment time is 8am.  , Please keep this appointment, Please arrive 15 minutes early., Bring ins card, picture id, and discharge papers    Care Management Interventions  PCP Verified by CM: Yes  Mode of Transport at Discharge: Self  Transition of Care Consult (CM Consult): Discharge Planning  Health Maintenance Reviewed: Yes  Current Support Network: Lives Alone  Confirm Follow Up Transport: Self  The Plan for Transition of Care is Related to the Following Treatment Goals :  Follow-up PCP and Neurology appointments  The Patient and/or Patient Representative was Provided with a Choice of Provider and Agrees with the Discharge Plan?: Yes  Name of the Patient Representative Who was Provided with a Choice of Provider and Agrees with the Discharge Plan: Patient  Freedom of Choice List was Provided with Basic Dialogue that Supports the Patient's Individualized Plan of Care/Goals, Treatment Preferences and Shares the Quality Data Associated with the Providers?: Yes  Discharge Location  Discharge Placement: Home     VIVIANA Waddell/SHAHBAZ  [phone]

## 2021-05-03 NOTE — PROGRESS NOTES
Problem: Falls - Risk of  Goal: *Absence of Falls  Description: Document Luther Whitney Fall Risk and appropriate interventions in the flowsheet. Outcome: Progressing Towards Goal  Note: Fall Risk Interventions:       Mentation Interventions: Adequate sleep, hydration, pain control, Door open when patient unattended, Familiar objects from home    Medication Interventions: Bed/chair exit alarm, Patient to call before getting OOB         History of Falls Interventions: Bed/chair exit alarm, Door open when patient unattended         Problem: Falls - Risk of  Goal: *Absence of Falls  Description: Document Luther Whitney Fall Risk and appropriate interventions in the flowsheet.   Outcome: Progressing Towards Goal  Note: Fall Risk Interventions:       Mentation Interventions: Adequate sleep, hydration, pain control, Door open when patient unattended, Familiar objects from home    Medication Interventions: Bed/chair exit alarm, Patient to call before getting OOB         History of Falls Interventions: Bed/chair exit alarm, Door open when patient unattended         Problem: Seizure Disorder (Adult)  Goal: *STG: Remains free of seizure activity  Outcome: Progressing Towards Goal

## 2021-05-03 NOTE — TELEPHONE ENCOUNTER
----- Message from [facility] sent at 5/3/2021 11:09 AM EDT -----  Regarding: FW: /Telephone    ----- Message -----  From: Howard Kathleen  Sent: 5/3/2021  10:52 AM EDT  To: [place] [address] Office Pool  Subject: /Telephone                          Appointment not available    Caller's first and last name and relationship to patient (if not the patient): Moiz Russell [facility] contact number: [phone]       Preferred date and time: 3-5 days       Scheduled appointment date and time: N/A       Reason for appointment: Hospital follow up       Details to clarify the request: N/A       Trevon Mendez

## 2021-05-03 NOTE — PROGRESS NOTES
Patient educated on and given discharge instructions with understanding noted. Patient educated on not driving for 6 months. PIV removed per order with no issues noted. Patient dressed and belongings gathered. Patient states no questions or concerns at time of discharge. Patient stable for discharge.

## 2021-05-03 NOTE — DISCHARGE SUMMARY
Hospitalist Discharge Summary     Patient ID:  Carole Zamudio  [id number]  2 y.o.  1985    PCP on record: Vivek Gurrola DO    Admit date: 5/2/2021  Discharge date and time: 5/3/2021      Admission Diagnoses: Seizure, partial ([facility] [R56.9]    Discharge Diagnoses: Active Problems:    Seizure, partial ([address] (5/2/2021)           Hospital Course:       #Breakthrough seizure POA ( patient ran out of AED) POA resolved   #Partial complex seizures with secondary generalization POA  -CT scan of head unremarkable for acute changes  -CT maxillofacial unremarkable for any acute changes  -Patient has known history of seizures and takes Trileptal 600 mg twice a day and follows neurology as an outpatient  -Trileptal level pending  -Keppra load given in the ED  -Resumed Trileptal  -EEG 5/2/21: The EEG revealed occasional nonlateralized slowing without overt epileptic activity or significant asymmetry. This may represent postictal state otherwise unremarkable  -MRI 11/11/2020 normal MRI of brain normal MRI evaluation of temporal lobes   - Neurology consulted and recommend resumption of AED and outpatient follow up w/ primary   - No driving for 6 months   - patient agreed and verbalized understanding      #High anion gap metabolic acidosis secondary lactic acidosis due to seizure activity POA resolved        #Acute kidney injury resolved   -Serum creatinine 1.26 > 1.00 baseline 0.78        # Tobacco abuse   - 2 pack a day   -Patient was counseled extensively on the need to abstain from tobacco, its addictive tendencies, its deleterious effects on the lungs, cardiovascular  as well as its financial & social sequelae    # chronic leg pain   - on gabapentin 300 mg BID      #Obesity  - BMI 35    CONSULTATIONS:  IP CONSULT TO NEUROLOGY    Excerpted HPI from H&P of Anderson Johnston MD:    Marcus Casas is a 39 y.o.    male with PMH of  Above mentioned problems  who presents to ED  By EMS for found down. Patient has a hx of seizure d/o and was out of his AED for past couple of days. Patient states he has an appointment coming up in two weeks. Patient denied any chest pain, SOB, belly pain or any headache. Endorsed 2 pack a day since 5years of age   [de-identified] Drug use / etoh use      We were asked to admit for work up and evaluation of the above problems.      ______________________________________________________________________  DISCHARGE SUMMARY/HOSPITAL COURSE:  for full details see H&P, daily progress notes, labs, consult notes. _______________________________________________________________________  Patient seen and examined by me on discharge day. Pertinent Findings:  Gen:    Not in distress  Chest: Clear lungs  CVS:   Regular rhythm. No edema  Abd:  Soft, not distended, not tender  Neuro:  Alert with good insight. Oriented to person, place, and time   _______________________________________________________________________  DISCHARGE MEDICATIONS:   Current Discharge Medication List      START taking these medications    Details   bacitracin (BACITRACIN) 500 unit/gram oint Apply  to affected area three (3) times daily for 10 days. Apply to affected area  Qty: 1 Tube, Refills: 0         CONTINUE these medications which have CHANGED    Details   gabapentin (NEURONTIN) 300 mg capsule Take 1 Cap by mouth two (2) times a day. Max Daily Amount: 600 mg. Qty: 60 Cap, Refills: 2    Associated Diagnoses: Pain in both lower extremities; Myalgia; Arthralgia, unspecified joint; Insomnia, unspecified type      OXcarbazepine (TRILEPTAL) 600 mg tablet Take 1 Tab by mouth two (2) times a day.   Qty: 60 Tab, Refills: 3    Associated Diagnoses: Complex partial seizures with consciousness impaired ([facility]             My Recommended Diet, Activity, Wound Care, and follow-up labs are listed in the patient's Discharge Insturctions which I have personally completed and reviewed. _______________________________________________________________________  DISPOSITION:     Home with Family: x   Home with HH/PT/OT/RN:    SNF/LTC:    DHEERAJ:    OTHER:        Condition at Discharge:  Stable  _______________________________________________________________________  Follow up with:   PCP : Maegan Franco DO  Follow-up Information     Follow up With Specialties Details Why Contact Info    Pankaj Cote MD Neurology On 5/14/2021 Your appointment time is 8am.  , Please keep this appointment, Please arrive 15 minutes early., Bring ins card, picture id, and discharge papers [address] [place] [address]  [address] [place] [address]  [phone]      Maegan Franco DO Internal Medicine Call on 5/5/2021 Please call office by 12 noon on Wednesday, 5/5 to verify post-hospitalization appointment.  [facility]  [phone]                Total time in minutes spent coordinating this discharge (includes going over instructions, follow-up, prescriptions, and preparing report for sign off to her PCP) :  35 minutes    Signed:  Jerry Lovett MD

## 2021-05-03 NOTE — DISCHARGE INSTRUCTIONS
- Please take triletpal and gabapentin to prevent seizures   - Please follow up neurology for further management of seizure disorder   - Please dont drive for 6 monthns or until cleared by your neurologist

## 2021-05-04 LAB — OXCARBAZEPINE SERPL-MCNC: 2 UG/ML (ref 10–35)

## 2021-05-04 NOTE — TELEPHONE ENCOUNTER
Called patient, unable to reach patient at this time. No personal VM set up, left generic message for patient to return call to office at earliest convenience.

## 2021-05-05 ENCOUNTER — TELEPHONE (OUTPATIENT)
Dept: CASE MANAGEMENT | Age: 36
End: 2021-05-05

## 2021-05-05 NOTE — TELEPHONE ENCOUNTER
CM called patient for the purpose of follow up to inpatient admission to check on environmental challenges/medications/appointment follow up/and questions/concerns. . The call was answered by patient VM CM left VM to return call. CM will attempt a 2nd call.     [address] [place] [address]  [phone]

## 2021-05-14 ENCOUNTER — VIRTUAL VISIT (OUTPATIENT)
Dept: NEUROLOGY | Age: 36
End: 2021-05-14
Payer: MEDICAID

## 2021-05-14 DIAGNOSIS — M25.50 ARTHRALGIA, UNSPECIFIED JOINT: ICD-10-CM

## 2021-05-14 DIAGNOSIS — M79.604 PAIN IN BOTH LOWER EXTREMITIES: ICD-10-CM

## 2021-05-14 DIAGNOSIS — G40.919 BREAKTHROUGH SEIZURE (HCC): ICD-10-CM

## 2021-05-14 DIAGNOSIS — G47.00 INSOMNIA, UNSPECIFIED TYPE: ICD-10-CM

## 2021-05-14 DIAGNOSIS — G40.209 COMPLEX PARTIAL SEIZURES WITH CONSCIOUSNESS IMPAIRED (HCC): Primary | ICD-10-CM

## 2021-05-14 DIAGNOSIS — M79.605 PAIN IN BOTH LOWER EXTREMITIES: ICD-10-CM

## 2021-05-14 DIAGNOSIS — M79.10 MYALGIA: ICD-10-CM

## 2021-05-14 PROCEDURE — 99215 OFFICE O/P EST HI 40 MIN: CPT | Performed by: PSYCHIATRY & NEUROLOGY

## 2021-05-14 RX ORDER — NABUMETONE 500 MG/1
TABLET, FILM COATED ORAL
COMMUNITY
Start: 2021-05-03 | End: 2022-09-19
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
## 2021-05-14 NOTE — PATIENT INSTRUCTIONS
As per our discussion If you are having trouble getting your medication you need to contact our office ASAP. According to my records I did not see where there was a break in prescription coverage but nonetheless there obviously was. Continue on Trileptal 600 mg 1 tablet twice a day You have had a breakthrough seizure you are not allowed to drive via [employer] for 6 months from date of last event. The most efficient way to contact us is through [address] And if you are having trouble getting refills on your medications please let us know ASAP. Additionally I strongly recommend contacting your pharmacy about a week ahead of time to get a refill initiated in case there are problems so you do not run out of medication Office Policies 
 
o Phone calls/patient messages: Please allow up to 24 hours for someone in the office to contact you about your call or message. Be mindful your provider may be out of the office or your message may require further review. We encourage you to use [employer] for your messages as this is a faster, more efficient way to communicate with our office 
 
o Medication Refills: 
Prescription medications require up to 48 business hours to process. We encourage you to use [employer] for your refills. For controlled medications: Please allow up to 72 business hours to process. Certain medications may require you to  a written prescription at our office. NO narcotic/controlled medications will be prescribed after 4pm Monday through Friday or on weekends 
 
o Form/Paperwork Completion: We ask that you allow 7-14 business days. You may also download your forms to [employer] to have your doctor print off.

## 2021-06-22 ENCOUNTER — TELEPHONE (OUTPATIENT)
Dept: SLEEP MEDICINE | Age: 36
End: 2021-06-22

## 2021-06-22 NOTE — TELEPHONE ENCOUNTER
Reached out to patient on 06/22/2021 at 2:22pm, 3:20pm and 3:30pm to register patient for Virtual visit with Dr. Speedy Lam with no response back from patient. Should patient call back we can reschedule him.

## 2021-07-14 DIAGNOSIS — G40.209 COMPLEX PARTIAL SEIZURES WITH CONSCIOUSNESS IMPAIRED (HCC): Primary | ICD-10-CM

## 2021-07-14 DIAGNOSIS — Z51.81 THERAPEUTIC DRUG MONITORING: ICD-10-CM

## 2021-07-14 NOTE — PROGRESS NOTES
Lab work for Trileptal has been ordered hopefully can get completed prior to next office visit on 7/20/2021

## 2021-09-09 ENCOUNTER — OFFICE VISIT (OUTPATIENT)
Dept: NEUROLOGY | Age: 36
End: 2021-09-09
Payer: MEDICAID

## 2021-09-09 VITALS
DIASTOLIC BLOOD PRESSURE: 78 MMHG | SYSTOLIC BLOOD PRESSURE: 120 MMHG | HEART RATE: 89 BPM | BODY MASS INDEX: 32.76 KG/M2 | RESPIRATION RATE: 17 BRPM | OXYGEN SATURATION: 99 % | HEIGHT: 71 IN | WEIGHT: 234 LBS

## 2021-09-09 DIAGNOSIS — M79.604 PAIN IN BOTH LOWER EXTREMITIES: ICD-10-CM

## 2021-09-09 DIAGNOSIS — G47.00 INSOMNIA, UNSPECIFIED TYPE: ICD-10-CM

## 2021-09-09 DIAGNOSIS — G40.919 BREAKTHROUGH SEIZURE (HCC): ICD-10-CM

## 2021-09-09 DIAGNOSIS — M79.605 PAIN IN BOTH LOWER EXTREMITIES: ICD-10-CM

## 2021-09-09 DIAGNOSIS — G40.209 COMPLEX PARTIAL SEIZURES WITH CONSCIOUSNESS IMPAIRED (HCC): Primary | ICD-10-CM

## 2021-09-09 PROCEDURE — 99215 OFFICE O/P EST HI 40 MIN: CPT | Performed by: PSYCHIATRY & NEUROLOGY

## 2021-09-09 RX ORDER — OXCARBAZEPINE 600 MG/1
600 TABLET, FILM COATED ORAL 2 TIMES DAILY
Qty: 180 TABLET | Refills: 3 | Status: SHIPPED | OUTPATIENT
Start: 2021-09-09 | End: 2022-05-06 | Stop reason: SDUPTHER
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
## 2021-09-09 NOTE — LETTER
9/9/2021    Patient: Toma Ring   YOB: 1985   Date of Visit: 9/9/2021     DO Sayda Henriquez   [address]  [address] [address]  [place] [facility] [address]    Dear Gilbert Dunbar DO,      Thank you for referring Mr. Jean-Paul Friedman to [address] [place] [address] for evaluation. My notes for this consultation are attached. If you have questions, please do not hesitate to call me. I look forward to following your patient along with you.       Sincerely,    Vanessa Sandoval NP

## 2021-09-09 NOTE — PATIENT INSTRUCTIONS
As per discussion    Please call Dr. Cherelle Morrison office to set up an evaluation for your inability to sleep. A copy of the referral has been given to you at today's office visit the phone number is on that referral  Dr. Cherelle Morrison reached out to you on 3 different occasions for virtual visit you did not reply    You cannot drive until November 5 at the earliest assuming you do not have any more seizures  A new prescription for your Trileptal 600 mg 1 tablet twice a day has been sent to your pharmacy with a 90-day supply and refills x1 year so there is no reason for you to run out of your medication      I fill out your [facility] paperwork but will be up to [facility] to determine whether they allow you to drive on November 5 but not outside of my control    [employer]    o Phone calls/patient messages:  Please allow up to 24 hours for someone in the office to contact you about your call or message. Be mindful your provider may be out of the office or your message may require further review. We encourage you to use [employer] for your messages as this is a faster, more efficient way to communicate with our office    o Medication Refills:  Prescription medications require up to 48 business hours to process. We encourage you to use [employer] for your refills. For controlled medications: Please allow up to 72 business hours to process. Certain medications may require you to  a written prescription at our office. NO narcotic/controlled medications will be prescribed after 4pm Monday through Friday or on weekends    o Form/Paperwork Completion:  We ask that you allow 7-14 business days. You may also download your forms to [employer] to have your doctor print off.

## 2021-09-09 NOTE — PROGRESS NOTES
Identified pt with two pt identifiers(name and ). Reviewed record in preparation for visit and have obtained necessary documentation. Chief Complaint   Patient presents with    Follow-up   No concerns. Health Maintenance Due   Topic    Pneumococcal 0-64 years (1 of 2 - PPSV23)    COVID-19 Vaccine (1)    Medicare Yearly Exam     Flu Vaccine (1)        Visit Vitals  /78 (BP 1 Location: Left upper arm, BP Patient Position: Sitting)   Pulse 89   Resp 17   Ht 5' 11\" (1.803 m)   Wt 234 lb (106.1 kg)   SpO2 99%   BMI 32.64 kg/m²     Pain Scale: 0 - No pain/10    Coordination of Care Questionnaire:  :   1. Have you been to the ER, urgent care clinic since your last visit? Hospitalized since your last visit? No    2. Have you seen or consulted any other health care providers outside of the [address] [place] [address] since your last visit? Include any pap smears or colon screening.  No

## 2021-10-02 ENCOUNTER — HOSPITAL ENCOUNTER (EMERGENCY)
Age: 36
Discharge: STILL ADMITTED OR ADMINISTRATIVE | End: 2021-10-02

## 2021-12-09 DIAGNOSIS — M79.605 PAIN IN BOTH LOWER EXTREMITIES: ICD-10-CM

## 2021-12-09 DIAGNOSIS — M79.604 PAIN IN BOTH LOWER EXTREMITIES: ICD-10-CM

## 2021-12-09 DIAGNOSIS — M25.50 ARTHRALGIA, UNSPECIFIED JOINT: ICD-10-CM

## 2021-12-09 DIAGNOSIS — M79.10 MYALGIA: ICD-10-CM

## 2021-12-09 DIAGNOSIS — G47.00 INSOMNIA, UNSPECIFIED TYPE: ICD-10-CM

## 2021-12-09 RX ORDER — GABAPENTIN 300 MG/1
300 CAPSULE ORAL 2 TIMES DAILY
Qty: 60 CAPSULE | Refills: 2 | Status: SHIPPED | OUTPATIENT
Start: 2021-12-09 | End: 2022-05-06 | Stop reason: SDUPTHER

## 2022-01-25 ENCOUNTER — TELEPHONE (OUTPATIENT)
Dept: NEUROLOGY | Age: 37
End: 2022-01-25

## 2022-01-25 NOTE — TELEPHONE ENCOUNTER
lvm for patient that dmv form has been faxed to [facility] and copied to chart. Placing in the mail for patient.

## 2022-03-10 ENCOUNTER — OFFICE VISIT (OUTPATIENT)
Dept: NEUROLOGY | Age: 37
End: 2022-03-10
Payer: MEDICAID

## 2022-03-10 VITALS
HEIGHT: 71 IN | HEART RATE: 89 BPM | OXYGEN SATURATION: 99 % | DIASTOLIC BLOOD PRESSURE: 80 MMHG | TEMPERATURE: 97.8 F | WEIGHT: 250 LBS | SYSTOLIC BLOOD PRESSURE: 118 MMHG | RESPIRATION RATE: 16 BRPM | BODY MASS INDEX: 35 KG/M2

## 2022-03-10 DIAGNOSIS — G40.919 BREAKTHROUGH SEIZURE (HCC): ICD-10-CM

## 2022-03-10 DIAGNOSIS — R55 BLACKOUT SPELL: Primary | ICD-10-CM

## 2022-03-10 DIAGNOSIS — G40.209 COMPLEX PARTIAL SEIZURES WITH CONSCIOUSNESS IMPAIRED (HCC): ICD-10-CM

## 2022-03-10 DIAGNOSIS — G47.00 INSOMNIA, UNSPECIFIED TYPE: ICD-10-CM

## 2022-03-10 PROCEDURE — 99215 OFFICE O/P EST HI 40 MIN: CPT | Performed by: PSYCHIATRY & NEUROLOGY

## 2022-03-10 NOTE — ASSESSMENT & PLAN NOTE
Unclear etiology but with such poor sleep habits may be an additional etiology of sleep disorder contributing and unclear whether he has a cardiac problem.   Subsequently he will be referred for sleep studies, cardiac evaluation and I will repeat EEG

## 2022-03-10 NOTE — LETTER
3/10/2022    Patient: Montse Thomas   YOB: 1985   Date of Visit: 3/10/2022     Areli Montiel III, DO  [address] [facility] [address]  [address] [address]  [place]    Dear Zaki Ray DO,      Thank you for referring Mr. Disha Ziegler to [facility] for evaluation. My notes for this consultation are attached. If you have questions, please do not hesitate to call me. I look forward to following your patient along with you.       Sincerely,    Patrick Erwin NP
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
## 2022-03-10 NOTE — ASSESSMENT & PLAN NOTE
Patient continues to have an event about every 5 to 7 months  Most recently to the ED on 10/20/2021 brought in by EMS   Documentation supports seizure event but patient left AMA

## 2022-03-10 NOTE — ASSESSMENT & PLAN NOTE
Patient was to the ED for presumed seizure October 20, 2021 but left AMA  He is to continue on Trileptal 600 mg twice daily    Patient is requesting DMV paperwork to be filled out.   Being fully transparent for the patient I told him I would fill out his DMV paperwork noting that he has not had any events for 6 months but due to the fact that he has repetitive blackouts that I would not feel he was safe to drive    Patient became very upset with me and put his DMV paperwork away

## 2022-03-10 NOTE — PATIENT INSTRUCTIONS
As per discussion    I have made a referral to cardiology and to sleep to get further evaluation regarding your blackout spells. You have copies of those referrals if you do not hear from them in about a week you may call them to set up your initial evaluation appointment    Please make sure you keep your appointments so we can get a better understanding of why you are having blackouts beside the possibility of having a seizure    The spells are unpredictable and we do not have a true understanding of why they are occurring and therefore I would not recommend that you are safe to drive at this time    I am sorry this is an inconvenience to you to restrictions on your lifestyle however we cannot take the risk that you may have 1 of these spells while driving and injure somebody else    Finally had an EEG to assess the integrity of your brainwave pattern as it relates to seizures        Office Policies    o Phone calls/patient messages:  Please allow up to 24 hours for someone in the office to contact you about your call or message. Be mindful your provider may be out of the office or your message may require further review. We encourage you to use [employer] ([employer]) for your messages as this is a faster, more efficient way to communicate with our office    o Medication Refills:  Prescription medications require up to 48 business hours to process. We encourage you to use [employer] ([employer]) for your refills. For controlled medications: Please allow up to 72 business hours to process. Certain medications may require you to  a written prescription at our office. NO narcotic/controlled medications will be prescribed after 4pm Monday through Friday or on weekends    o Form/Paperwork Completion:  We ask that you allow 7-14 business days. You may also download your forms to [employer] ([employer]) to have your doctor print off.

## 2022-03-10 NOTE — PROGRESS NOTES
Chief Complaint   Patient presents with    Seizure     6m f/u    Myalgia       1. Have you been to the ER, urgent care clinic since your last visit? Hospitalized since your last visit? No    2. Have you seen or consulted any other health care providers outside of the [address] [place] [address] since your last visit? Include any pap smears or colon screening. No    Pt c/o leg pain, especially when cold outside.

## 2022-03-18 PROBLEM — G47.00 INSOMNIA: Status: ACTIVE | Noted: 2021-09-09

## 2022-03-19 PROBLEM — M79.604 PAIN IN BOTH LOWER EXTREMITIES: Status: ACTIVE | Noted: 2021-09-09

## 2022-03-19 PROBLEM — R56.9 SEIZURE, PARTIAL (HCC): Status: ACTIVE | Noted: 2021-05-02

## 2022-03-19 PROBLEM — R55 BLACKOUT SPELL: Status: ACTIVE | Noted: 2022-03-10

## 2022-03-19 PROBLEM — M79.605 PAIN IN BOTH LOWER EXTREMITIES: Status: ACTIVE | Noted: 2021-09-09

## 2022-03-20 PROBLEM — G40.919 BREAKTHROUGH SEIZURE (HCC): Status: ACTIVE | Noted: 2021-09-09

## 2022-04-21 ENCOUNTER — HOSPITAL ENCOUNTER (OUTPATIENT)
Dept: NEUROLOGY | Age: 37
Discharge: HOME | End: 2022-04-21
Payer: MEDICAID

## 2022-04-21 DIAGNOSIS — R55 BLACKOUT SPELL: ICD-10-CM

## 2022-04-21 DIAGNOSIS — G40.209 COMPLEX PARTIAL SEIZURES WITH CONSCIOUSNESS IMPAIRED (HCC): ICD-10-CM

## 2022-04-21 PROCEDURE — 95816 EEG AWAKE AND DROWSY: CPT

## 2022-05-06 DIAGNOSIS — M79.605 PAIN IN BOTH LOWER EXTREMITIES: ICD-10-CM

## 2022-05-06 DIAGNOSIS — G47.00 INSOMNIA, UNSPECIFIED TYPE: ICD-10-CM

## 2022-05-06 DIAGNOSIS — M79.604 PAIN IN BOTH LOWER EXTREMITIES: ICD-10-CM

## 2022-05-06 DIAGNOSIS — M79.10 MYALGIA: ICD-10-CM

## 2022-05-06 DIAGNOSIS — M25.50 ARTHRALGIA, UNSPECIFIED JOINT: ICD-10-CM

## 2022-05-06 DIAGNOSIS — G40.209 COMPLEX PARTIAL SEIZURES WITH CONSCIOUSNESS IMPAIRED (HCC): ICD-10-CM

## 2022-05-06 NOTE — TELEPHONE ENCOUNTER
Requested Prescriptions     Pending Prescriptions Disp Refills    gabapentin (NEURONTIN) 300 mg capsule 60 Capsule 4     Sig: Take 1 Capsule by mouth two (2) times a day. Max Daily Amount: 600 mg.    OXcarbazepine (TRILEPTAL) 600 mg tablet 180 Tablet 3     Sig: Take 1 Tablet by mouth two (2) times a day.  Indications: convulsive seizures     Last fill  Gabapentin 12/9/21 & Trileptal 9/9/21 Last visit 3/10/22 Next visit 9/19/22

## 2022-05-10 RX ORDER — GABAPENTIN 300 MG/1
300 CAPSULE ORAL 2 TIMES DAILY
Qty: 60 CAPSULE | Refills: 4 | Status: SHIPPED | OUTPATIENT
Start: 2022-05-10 | End: 2022-09-04 | Stop reason: SDUPTHER

## 2022-05-10 RX ORDER — OXCARBAZEPINE 600 MG/1
600 TABLET, FILM COATED ORAL 2 TIMES DAILY
Qty: 180 TABLET | Refills: 3 | Status: SHIPPED | OUTPATIENT
Start: 2022-05-10 | End: 2022-09-04 | Stop reason: SDUPTHER

## 2022-06-22 ENCOUNTER — OFFICE VISIT (OUTPATIENT)
Dept: SLEEP MEDICINE | Age: 37
End: 2022-06-22
Payer: MEDICAID

## 2022-06-22 VITALS
RESPIRATION RATE: 20 BRPM | WEIGHT: 258.7 LBS | TEMPERATURE: 97.2 F | HEIGHT: 71 IN | DIASTOLIC BLOOD PRESSURE: 90 MMHG | OXYGEN SATURATION: 96 % | HEART RATE: 92 BPM | BODY MASS INDEX: 36.22 KG/M2 | SYSTOLIC BLOOD PRESSURE: 133 MMHG

## 2022-06-22 DIAGNOSIS — F41.9 ANXIETY AND DEPRESSION: ICD-10-CM

## 2022-06-22 DIAGNOSIS — G40.909 SEIZURE DISORDER (HCC): ICD-10-CM

## 2022-06-22 DIAGNOSIS — F32.A ANXIETY AND DEPRESSION: ICD-10-CM

## 2022-06-22 DIAGNOSIS — G47.33 OSA (OBSTRUCTIVE SLEEP APNEA): Primary | ICD-10-CM

## 2022-06-22 PROCEDURE — 99204 OFFICE O/P NEW MOD 45 MIN: CPT | Performed by: INTERNAL MEDICINE

## 2022-06-22 NOTE — PATIENT INSTRUCTIONS
[address] [facility] [place]. [place], [address]  Tel.  [phone]  Fax. [address] [facility] [address]  [place], [address] [facility]  Tel.  [phone]  Fax. [phone] [address]  Tel.  [phone]  Fax. [phone]     Sleep Apnea: After Your Visit  Your Care Instructions  Sleep apnea occurs when you frequently stop breathing for 10 seconds or longer during sleep. It can be mild to severe, based on the number of times per hour that you stop breathing or have slowed breathing. Blocked or narrowed airways in your nose, mouth, or throat can cause sleep apnea. Your airway can become blocked when your throat muscles and tongue relax during sleep. Sleep apnea is common, occurring in 1 out of 20 individuals. Individuals having any of the following characteristics should be evaluated and treated right away due to high risk and detrimental consequences from untreated sleep apnea:  1. Obesity  2. Congestive Heart failure  3. Atrial Fibrillation  4. Uncontrolled Hypertension  5. Type II Diabetes  6. Night-time Arrhythmias  7. Stroke  8. Pulmonary Hypertension  9. High-risk Driving Populations (pilots, truck drivers, etc.)  10. Patients Considering Weight-loss Surgery    How do you know you have sleep apnea? You probably have sleep apnea if you answer 'yes' to 3 or more of the following questions:  S - Have you been told that you Snore? T - Are you often Tired during the day? O - Has anyone Observed you stop breathing while sleeping? P- Do you have (or are being treated for) high blood Pressure? B - Are you obese (Body Mass Index > 35)? A - Is your Age 48years old or older? N - Is your Neck size greater than 16 inches? G - Are you male Gender? A sleep physician can prescribe a breathing device that prevents tissues in the throat from blocking your airway.  Or your doctor may recommend using a dental device (oral breathing device) to help keep your airway open. In some cases, surgery may be needed to remove enlarged tissues in the throat. Follow-up care is a key part of your treatment and safety. Be sure to make and go to all appointments, and call your doctor if you are having problems. It's also a good idea to know your test results and keep a list of the medicines you take. How can you care for yourself at home? · Lose weight, if needed. It may reduce the number of times you stop breathing or have slowed breathing. · Go to bed at the same time every night. · Sleep on your side. It may stop mild apnea. If you tend to roll onto your back, sew a pocket in the back of your pajama top. Put a tennis ball into the pocket, and stitch the pocket shut. This will help keep you from sleeping on your back. · Avoid alcohol and medicines such as sleeping pills and sedatives before bed. · Do not smoke. Smoking can make sleep apnea worse. If you need help quitting, talk to your doctor about stop-smoking programs and medicines. These can increase your chances of quitting for good. · Prop up the head of your bed 4 to 6 inches by putting bricks under the legs of the bed. · Treat breathing problems, such as a stuffy nose, caused by a cold or allergies. · Use a continuous positive airway pressure (CPAP) breathing machine if lifestyle changes do not help your apnea and your doctor recommends it. The machine keeps your airway from closing when you sleep. · If CPAP does not help you, ask your doctor whether you should try other breathing machines. A bilevel positive airway pressure machine has two types of air pressureâone for breathing in and one for breathing out. Another device raises or lowers air pressure as needed while you breathe. · If your nose feels dry or bleeds when using one of these machines, talk with your doctor about increasing moisture in the air. A humidifier may help.   · If your nose is runny or stuffy from using a breathing machine, talk with your doctor about using decongestants or a corticosteroid nasal spray. When should you call for help? Watch closely for changes in your health, and be sure to contact your doctor if:  · You still have sleep apnea even though you have made lifestyle changes. · You are thinking of trying a device such as CPAP. · You are having problems using a CPAP or similar machine. Where can you learn more? Go to [employer]. Enter E428 in the search box to learn more about \"Sleep Apnea: After Your Visit. \"   © 3668-3749 Healthwise, Incorporated. Care instructions adapted under license by [address] [place] [address] [employer] (which disclaims liability or warranty for this information). This care instruction is for use with your licensed healthcare professional. If you have questions about a medical condition or this instruction, always ask your healthcare professional. [employer] any warranty or liability for your use of this information. PROPER SLEEP HYGIENE    What to avoid  · Do not have drinks with caffeine, such as coffee or black tea, for 8 hours before bed. · Do not smoke or use other types of tobacco near bedtime. Nicotine is a stimulant and can keep you awake. · Avoid drinking alcohol late in the evening, because it can cause you to wake in the middle of the night. · Do not eat a big meal close to bedtime. If you are hungry, eat a light snack. · Do not drink a lot of water close to bedtime, because the need to urinate may wake you up during the night. · Do not read or watch TV in bed. Use the bed only for sleeping and sexual activity. What to try  · Go to bed at the same time every night, and wake up at the same time every morning. Do not take naps during the day. · Keep your bedroom quiet, dark, and cool. · Get regular exercise, but not within 3 to 4 hours of your bedtime. .  · Sleep on a comfortable pillow and mattress.   · If watching the clock makes you anxious, turn it facing away from you so you cannot see the time. · If you worry when you lie down, start a worry book. Well before bedtime, write down your worries, and then set the book and your concerns aside. · Try meditation or other relaxation techniques before you go to bed. · If you cannot fall asleep, get up and go to another room until you feel sleepy. Do something relaxing. Repeat your bedtime routine before you go to bed again. · Make your house quiet and calm about an hour before bedtime. Turn down the lights, turn off the TV, log off the computer, and turn down the volume on music. This can help you relax after a busy day. Drowsy Driving  The [address] [place] [address] Road Traffic Safety Administration cites drowsiness as a causing factor in more than 899,388 police reported crashes annually, resulting in 76,000 injuries and 1,500 deaths. Other surveys suggest 55% of people polled have driven while drowsy in the past year, 23% had fallen asleep but not crashed, 3% crashed, and 2% had and accident due to drowsy driving. Who is at risk? Young Drivers: One study of drowsy driving accidents states that 55% of the drivers were under 25 years. Of those, 75% were male. Shift Workers and Travelers: People who work overnight or travel across time zones frequently are at higher risk of experiencing Circadian Rhythm Disorders. They are trying to work and function when their body is programed to sleep. Sleep Deprived: Lack of sleep has a serious impact on your ability to pay attention or focus on a task. Consistently getting less than the average of 8 hours your body needs creates partial or cumulative sleep deprivation. Untreated Sleep Disorders: Sleep Apnea, Narcolepsy, R.L.S., and other sleep disorders (untreated) prevent a person from getting enough restful sleep. This leads to excessive daytime sleepiness and increases the risk for drowsy driving accidents by up to 7 times.   Medications / Alcohol: Even over the counter medications can cause drowsiness. Medications that impair a drivers attention should have a warning label. Alcohol naturally makes you sleepy and on its own can cause accidents. Combined with excessive drowsiness its effects are amplified. Signs of Drowsy Driving:   * You don't remember driving the last few miles   * You may drift out of your dennise   * You are unable to focus and your thoughts wander   * You may yawn more often than normal   * You have difficulty keeping your eyes open / nodding off   * Missing traffic signs, speeding, or tailgating  Prevention-   Good sleep hygiene, lifestyle and behavioral choices have the most impact on drowsy driving. There is no substitute for sleep and the average person requires 8 hours nightly. If you find yourself driving drowsy, stop and sleep. Consider the sleep hygiene tips provided during your visit as well. Medication Refill Policy: Refills for all medications require 1 week advance notice. Please have your pharmacy fax a refill request. We are unable to fax, or call in \"controled substance\" medications and you will need to pick these prescriptions up from our office. [employer] Activation    Thank you for requesting access to [employer]. Please follow the instructions below to securely access and download your online medical record. [employer] allows you to send messages to your doctor, view your test results, renew your prescriptions, schedule appointments, and more. How Do I Sign Up? 1. In your internet browser, go to [email or web address]. 2. Click on the First Time User? Click Here link in the Sign In box. You will see the New Member Sign Up page. 3. Enter your [employer] Access Code exactly as it appears below. You will not need to use this code after youve completed the sign-up process. If you do not sign up before the expiration date, you must request a new code. [employer] Access Code:  Activation code not generated  Current [employer] Status: Active (This is the date your [employer] access code will )    4. Enter the last four digits of your Social Security Number (xxxx) and Date of Birth (mm/dd/yyyy) as indicated and click Submit. You will be taken to the next sign-up page. 5. Create a [employer] ID. This will be your [employer] login ID and cannot be changed, so think of one that is secure and easy to remember. 6. Create a [employer] password. You can change your password at any time. 7. Enter your Password Reset Question and Answer. This can be used at a later time if you forget your password. 8. Enter your e-mail address. You will receive e-mail notification when new information is available in [address] 9. Click Sign Up. You can now view and download portions of your medical record. 10. Click the Download Summary menu link to download a portable copy of your medical information. Additional Information    If you have questions, please call [phone]. Remember, [employer] is NOT to be used for urgent needs. For medical emergencies, dial 911.
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
## 2022-08-03 ENCOUNTER — DOCUMENTATION ONLY (OUTPATIENT)
Dept: SLEEP MEDICINE | Age: 37
End: 2022-08-03

## 2022-08-03 ENCOUNTER — TELEPHONE (OUTPATIENT)
Dept: SLEEP MEDICINE | Age: 37
End: 2022-08-03

## 2022-08-03 NOTE — PROGRESS NOTES
Peer to Peer complete. In lab sleep study approved.      FEDE Clancy-BC, [facility]   Nurse Practitioner  [facility] [address] [facility]

## 2022-08-03 NOTE — TELEPHONE ENCOUNTER
P2P Request for [id number]. P2P Scheduled for today at 3:30 to call Sherlyn Rand NP's cell for [facility] patient. Provider will be calling Mary Tesfaye but p2p line number is [phone] if we need to follow up. ID # [id number] Case/ Tracking # [id number]   Case set to  on .

## 2022-08-08 ENCOUNTER — HOSPITAL ENCOUNTER (OUTPATIENT)
Dept: SLEEP MEDICINE | Age: 37
Discharge: HOME | End: 2022-08-08
Payer: MEDICAID

## 2022-08-08 ENCOUNTER — TELEPHONE (OUTPATIENT)
Dept: SLEEP MEDICINE | Age: 37
End: 2022-08-08

## 2022-08-08 VITALS
WEIGHT: 258 LBS | HEIGHT: 71 IN | SYSTOLIC BLOOD PRESSURE: 127 MMHG | BODY MASS INDEX: 36.12 KG/M2 | HEART RATE: 106 BPM | DIASTOLIC BLOOD PRESSURE: 88 MMHG | OXYGEN SATURATION: 95 % | TEMPERATURE: 99.3 F

## 2022-08-08 DIAGNOSIS — G47.33 OSA (OBSTRUCTIVE SLEEP APNEA): ICD-10-CM

## 2022-08-08 PROCEDURE — 95810 POLYSOM 6/> YRS 4/> PARAM: CPT | Performed by: INTERNAL MEDICINE

## 2022-08-09 NOTE — PROGRESS NOTES
217 [facility]. [place], [address]  Tel.  [phone]  Fax. 100 [facility] 60  [place], [address] [facility]  Tel.  [phone]  Fax. [phone] [address]  Tel.  [phone]  Fax. [phone]     Sleep Study Technical Notes        PRE-Test:  Marialuisa Ball (: 1985) arrived on time. Vitals taken: temperature (99.3 ) BP (127/88) and SpO2 (95%) HR (106). He was shown directly to his room. He is here for a PSG study. Procedure was explained, questions were answered and he expressed understanding. He is going to sleep in the pants and christie shirt he arrived wearing. Electrodes were applied without incident. Once settled in bed, the study was started. Acquisition Notes:  Lights off: 9:23pm  Sleep onset: 12:10am  Respiratory events: hypopnea, central  ECG:  NSR  Other comments:   One extra pillow was provided. The patient sleeps with the TV on and may play music over his phone. He was instructed he could not use his over-the-ear headphones. The patient watched TV prior to attempting sleep. Respiratory events were observed in REM but were minimal.    POST Test:  Patient awakened. Electrodes were removed. Post Sleep Questionnaire completed. Patient stated that they were alert and ok to drive. Equipment and room cleaned per infection control policy.

## 2022-08-27 NOTE — TELEPHONE ENCOUNTER
Carmelina Mohan is to be contacted by lead sleep technologist regarding results of Sleep Testing which was indicative of an average AHI of 3.1 per hour with an SpO2 ernie of 90% and SpO2 of < 88% being 0.00 minutes. If patient has any further questions he should schedule a visit to discuss. Repeat testing is to be considered if sleep symptoms persist or worsen over time.

## 2022-09-01 ENCOUNTER — DOCUMENTATION ONLY (OUTPATIENT)
Dept: SLEEP MEDICINE | Age: 37
End: 2022-09-01

## 2022-09-04 DIAGNOSIS — M79.605 PAIN IN BOTH LOWER EXTREMITIES: ICD-10-CM

## 2022-09-04 DIAGNOSIS — M79.10 MYALGIA: ICD-10-CM

## 2022-09-04 DIAGNOSIS — M79.604 PAIN IN BOTH LOWER EXTREMITIES: ICD-10-CM

## 2022-09-04 DIAGNOSIS — G40.209 COMPLEX PARTIAL SEIZURES WITH CONSCIOUSNESS IMPAIRED (HCC): ICD-10-CM

## 2022-09-04 DIAGNOSIS — G47.00 INSOMNIA, UNSPECIFIED TYPE: ICD-10-CM

## 2022-09-04 DIAGNOSIS — M25.50 ARTHRALGIA, UNSPECIFIED JOINT: ICD-10-CM

## 2022-09-06 RX ORDER — GABAPENTIN 300 MG/1
300 CAPSULE ORAL 2 TIMES DAILY
Qty: 60 CAPSULE | Refills: 4 | Status: SHIPPED
Start: 2022-09-06 | End: 2022-09-19 | Stop reason: DRUGHIGH

## 2022-09-06 RX ORDER — OXCARBAZEPINE 600 MG/1
600 TABLET, FILM COATED ORAL 2 TIMES DAILY
Qty: 180 TABLET | Refills: 3 | Status: SHIPPED | OUTPATIENT
Start: 2022-09-06

## 2022-09-19 ENCOUNTER — OFFICE VISIT (OUTPATIENT)
Dept: NEUROLOGY | Age: 37
End: 2022-09-19
Payer: MEDICAID

## 2022-09-19 VITALS
DIASTOLIC BLOOD PRESSURE: 84 MMHG | OXYGEN SATURATION: 96 % | HEART RATE: 75 BPM | RESPIRATION RATE: 16 BRPM | SYSTOLIC BLOOD PRESSURE: 120 MMHG | WEIGHT: 257.4 LBS | HEIGHT: 71 IN | BODY MASS INDEX: 36.03 KG/M2 | TEMPERATURE: 97.7 F

## 2022-09-19 DIAGNOSIS — Z51.81 THERAPEUTIC DRUG MONITORING: ICD-10-CM

## 2022-09-19 DIAGNOSIS — G40.919 BREAKTHROUGH SEIZURE (HCC): ICD-10-CM

## 2022-09-19 DIAGNOSIS — R55 BLACKOUT SPELL: ICD-10-CM

## 2022-09-19 DIAGNOSIS — M79.604 PAIN IN BOTH LOWER EXTREMITIES: ICD-10-CM

## 2022-09-19 DIAGNOSIS — M79.605 PAIN IN BOTH LOWER EXTREMITIES: ICD-10-CM

## 2022-09-19 DIAGNOSIS — G40.209 COMPLEX PARTIAL SEIZURES WITH CONSCIOUSNESS IMPAIRED (HCC): Primary | ICD-10-CM

## 2022-09-19 DIAGNOSIS — G47.00 INSOMNIA, UNSPECIFIED TYPE: ICD-10-CM

## 2022-09-19 PROCEDURE — 99215 OFFICE O/P EST HI 40 MIN: CPT | Performed by: PSYCHIATRY & NEUROLOGY

## 2022-09-19 RX ORDER — GABAPENTIN 300 MG/1
300 CAPSULE ORAL 3 TIMES DAILY
Qty: 90 CAPSULE | Refills: 5 | Status: SHIPPED | OUTPATIENT
Start: 2022-09-19

## 2022-09-19 NOTE — ASSESSMENT & PLAN NOTE
Unclear etiology getting some relief from gabapentin but feels as though he might benefit from a higher dose    Gabapentin has been increased to 300 mg 3 times a day  A new prescription was sent to his pharmacy  I have also mentioned that I would not increase it further    Gabapentin may also give additional preventative therapy regarding his seizure events but that would be a secondary benefit is primarily getting this for leg pain

## 2022-09-19 NOTE — PROGRESS NOTES
Chief Complaint   Patient presents with    Follow-up     No seizures to report and nothing new to report either

## 2022-09-19 NOTE — ASSESSMENT & PLAN NOTE
Sleep studies unremarkable    Discussed sleep hygiene and written information was given to him about sleep hygiene as well    Poor sleep habits can certainly be a trigger factor for seizures
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
## 2022-09-19 NOTE — ASSESSMENT & PLAN NOTE
Unclear etiology  Patient has poor sleep habits but no definitive sleep disorder identified by recent sleep studies  Cardiac evaluation is also negative    EEG results pending at the time of this note

## 2022-09-19 NOTE — ASSESSMENT & PLAN NOTE
Patient historically has had breakthrough events about every 5 to 7 months  Most recent suspected event was 10/20/2021 when he was brought to the emergency room by EMS   Paperwork documents seizure but patient left AMA    There have been no ED visits but I can identify since 10/20/2021 patient denies any further events or blackout spells since 10/20/2022    Sleep studies have been completed no significant sleep disorder identified  Cardiac work-up completed no significant pathology identified    It has been greater than 6 months since patient's last event  By [facility] state regulation he can drive.   He will drop by his paperwork  I told him ultimately it is up to the [facility] as to whether they are going to reinstate his license

## 2022-09-19 NOTE — ASSESSMENT & PLAN NOTE
Continue on Trileptal 600 mg twice daily  Therapeutic blood work has been ordered  EEG was completed however results are still pending I will notify the patient via [employer] regarding those results    We discussed the impact of alcohol as it relates to seizure and seizure-like events as well as antiseizure medications

## 2022-09-19 NOTE — PATIENT INSTRUCTIONS
As per discussion    It has been 6 months since her last seizure so please bring your paperwork and we will fill out the [facility] forms appropriately but will be up to them to determine whether they are going to reinstate your license or not given the frequency of your events over the past couple of years    I have ordered blood work please get that completed on your way out of the office    I will let you know the results of the EEG via [address] we have reached out to the neurologist on-call to pull that up and get those results read thank you for getting it completed and my apologies for not having that data for you today    Your sleep studies do not show any significant process or problems he does have poor sleep hygiene below is some information regarding sleep hygiene that you may want to review to try to slowly change the habits of your brain so you sleep more easily  It does take time to retrain the brain to get you to sleep more consistently  Be aware that alcohol does interfere with sleep cycles as well    Please limit your alcohol intake as this lowers seizure threshold as well as how well your seizure medicine works and you are more apt to have a breakthrough seizure      Office Policies      Appointments  Please make sure that you arrive for your next appointment at least 15 minutes prior to your appointment time. If for some reason you are going to be late please notify the office to determine if you need to be rescheduled or we can adjust your appointment time      Phone calls/patient messages:  Please allow up to 24 hours for someone in the office to contact you about your call or message. Be mindful your provider may be out of the office or your message may require further review. We encourage you to use [employer] for your messages as this is a faster, more efficient way to communicate with our office    Medication Refills:  Prescription medications require up to 48 business hours to process.  We encourage you to use [employer] for your refills. For controlled medications: Please allow up to 72 business hours to process. Certain medications may require you to  a written prescription at our office. NO narcotic/controlled medications will be prescribed after 4pm Monday through Friday or on weekends    Form/Paperwork Completion:  We ask that you allow 7-14 business days. You may also download your forms to [employer] to have your doctor print off.         Sleep hygiene   so some things that you can do:  Get a bedtime routine starting at least 30 minutes prior to going to sleep to help quiet your mind   Start dimming the lights   Turn off excessive stimulation especially computers and phones   Use of calming teas   And setting up a fairly consistent routine so your body and mine learns its shutting down for the evening    When in bed  Try some light meditation such as focusing on your breathing  Try using a smart lacho that has white noise that is calming such as the sound of the ocean, whale noises or something similar to that  Focus on that noise and your breathing and when intrusive thoughts come in acknowledge them and push them away    Other things you can do to help reduce your anxiety and stress levels  Yoga  Being mindful of where you are and focusing just on that one activity or conversation  Writing things down and checking off the list  Prioritizing the list into A, B, and C things that must get done things that are important but can wait and then things that have no timeline    Prioritizing activities:  Write a list of everything you normally do  And then look for ways to trim off the nonessential things

## 2022-09-19 NOTE — LETTER
9/19/2022    Patient: Taj Dias   YOB: 1985   Date of Visit: 9/19/2022     Malina Coffman III,   [address]  [address]  [facility] [address]  [place] [place] [facility] [address]    Dear Madison Ga DO,      Thank you for referring Mr. Renee Thomas to [address] [place] [address] for evaluation. My notes for this consultation are attached. If you have questions, please do not hesitate to call me. I look forward to following your patient along with you.       Sincerely,    Hunter Ashraf NP

## 2022-09-28 LAB
ALBUMIN SERPL-MCNC: 4.1 G/DL (ref 4–5)
ALBUMIN/GLOB SERPL: 1.6 {RATIO} (ref 1.2–2.2)
ALP SERPL-CCNC: 95 IU/L (ref 44–121)
ALT SERPL-CCNC: 13 IU/L (ref 0–44)
AST SERPL-CCNC: 13 IU/L (ref 0–40)
BASOPHILS # BLD AUTO: 0.1 X10E3/UL (ref 0–0.2)
BASOPHILS NFR BLD AUTO: 1 %
BILIRUB SERPL-MCNC: 0.2 MG/DL (ref 0–1.2)
BUN SERPL-MCNC: 13 MG/DL (ref 6–20)
BUN/CREAT SERPL: 13 (ref 9–20)
CALCIUM SERPL-MCNC: 8.9 MG/DL (ref 8.7–10.2)
CHLORIDE SERPL-SCNC: 104 MMOL/L (ref 96–106)
CO2 SERPL-SCNC: 19 MMOL/L (ref 20–29)
CREAT SERPL-MCNC: 1.04 MG/DL (ref 0.76–1.27)
EGFR: 94 ML/MIN/1.73
EOSINOPHIL # BLD AUTO: 0.2 X10E3/UL (ref 0–0.4)
EOSINOPHIL NFR BLD AUTO: 3 %
ERYTHROCYTE [DISTWIDTH] IN BLOOD BY AUTOMATED COUNT: 13.1 % (ref 11.6–15.4)
GLOBULIN SER CALC-MCNC: 2.5 G/DL (ref 1.5–4.5)
GLUCOSE SERPL-MCNC: 93 MG/DL (ref 65–99)
HCT VFR BLD AUTO: 44.7 % (ref 37.5–51)
HGB BLD-MCNC: 15 G/DL (ref 13–17.7)
IMM GRANULOCYTES # BLD AUTO: 0 X10E3/UL (ref 0–0.1)
IMM GRANULOCYTES NFR BLD AUTO: 0 %
LYMPHOCYTES # BLD AUTO: 2 X10E3/UL (ref 0.7–3.1)
LYMPHOCYTES NFR BLD AUTO: 24 %
MCH RBC QN AUTO: 31.9 PG (ref 26.6–33)
MCHC RBC AUTO-ENTMCNC: 33.6 G/DL (ref 31.5–35.7)
MCV RBC AUTO: 95 FL (ref 79–97)
MONOCYTES # BLD AUTO: 0.6 X10E3/UL (ref 0.1–0.9)
MONOCYTES NFR BLD AUTO: 7 %
NEUTROPHILS # BLD AUTO: 5.5 X10E3/UL (ref 1.4–7)
NEUTROPHILS NFR BLD AUTO: 65 %
OXCARBAZEPINE SERPL-MCNC: 17 UG/ML (ref 10–35)
PLATELET # BLD AUTO: 273 X10E3/UL (ref 150–450)
POTASSIUM SERPL-SCNC: 4.1 MMOL/L (ref 3.5–5.2)
PROT SERPL-MCNC: 6.6 G/DL (ref 6–8.5)
RBC # BLD AUTO: 4.7 X10E6/UL (ref 4.14–5.8)
SODIUM SERPL-SCNC: 141 MMOL/L (ref 134–144)
VIT B1 BLD-SCNC: 139.7 NMOL/L (ref 66.5–200)
VIT B6 SERPL-MCNC: 6.2 UG/L (ref 3.4–65.2)
WBC # BLD AUTO: 8.4 X10E3/UL (ref 3.4–10.8)

## 2022-10-04 NOTE — PROGRESS NOTES
Results of the tests were reviewed in MyChart with the patient as follows:     At this time all your labs look good I would not recommend any changes in your treatment or interventions at this time based on labs

## 2022-12-09 ENCOUNTER — TELEPHONE (OUTPATIENT)
Dept: NEUROLOGY | Age: 37
End: 2022-12-09

## 2023-02-28 DIAGNOSIS — G40.209 COMPLEX PARTIAL SEIZURES WITH CONSCIOUSNESS IMPAIRED (HCC): ICD-10-CM

## 2023-02-28 DIAGNOSIS — Z51.81 THERAPEUTIC DRUG MONITORING: ICD-10-CM

## 2023-03-03 RX ORDER — GABAPENTIN 300 MG/1
300 CAPSULE ORAL 3 TIMES DAILY
Qty: 90 CAPSULE | Refills: 5 | Status: SHIPPED | OUTPATIENT
Start: 2023-03-03

## 2023-03-03 RX ORDER — OXCARBAZEPINE 600 MG/1
600 TABLET, FILM COATED ORAL 2 TIMES DAILY
Qty: 180 TABLET | Refills: 3 | Status: SHIPPED | OUTPATIENT
Start: 2023-03-03

## 2023-03-20 ENCOUNTER — OFFICE VISIT (OUTPATIENT)
Dept: NEUROLOGY | Age: 38
End: 2023-03-20
Payer: MEDICAID

## 2023-03-20 VITALS
OXYGEN SATURATION: 96 % | DIASTOLIC BLOOD PRESSURE: 80 MMHG | RESPIRATION RATE: 16 BRPM | SYSTOLIC BLOOD PRESSURE: 120 MMHG | WEIGHT: 261.2 LBS | HEIGHT: 71 IN | TEMPERATURE: 97.9 F | BODY MASS INDEX: 36.57 KG/M2 | HEART RATE: 102 BPM

## 2023-03-20 DIAGNOSIS — M79.604 PAIN IN BOTH LOWER EXTREMITIES: ICD-10-CM

## 2023-03-20 DIAGNOSIS — G40.209 COMPLEX PARTIAL SEIZURES WITH CONSCIOUSNESS IMPAIRED (HCC): ICD-10-CM

## 2023-03-20 DIAGNOSIS — M79.605 PAIN IN BOTH LOWER EXTREMITIES: ICD-10-CM

## 2023-03-20 DIAGNOSIS — G40.919 BREAKTHROUGH SEIZURE (HCC): ICD-10-CM

## 2023-03-20 PROCEDURE — 99214 OFFICE O/P EST MOD 30 MIN: CPT | Performed by: PSYCHIATRY & NEUROLOGY

## 2023-03-20 RX ORDER — OXCARBAZEPINE 600 MG/1
600 TABLET, FILM COATED ORAL 2 TIMES DAILY
Qty: 180 TABLET | Refills: 3 | Status: SHIPPED | OUTPATIENT
Start: 2023-03-20

## 2023-03-20 NOTE — ASSESSMENT & PLAN NOTE
Unclear etiology  Continue on gabapentin 300 mg 3 times a day   Again difficulty like to Trileptal with getting appropriate electronic refills through his pharmacy    Gabapentin may also give additional prevention regarding seizure events but this is predominantly being used for leg pain

## 2023-03-20 NOTE — ASSESSMENT & PLAN NOTE
Stable and able to stay on Trileptal 600 mg twice daily  Since he has been off for several weeks no point doing therapeutic monitoring  EEG from April 2022 reported as normal    I am at a loss to explain prescription refills continue to be a figueredo to be able to get refills completed smoothly I am sure it is multifactorial in nature   Electronic refills apparently did not go through   We faxed a refill request directly to the pharmacy and received confirmation fax that it did go through but yet the patient tells me that these medicines of the Trileptal and the gabapentin are still not at his pharmacy   I have given him a hardcopy for the Trileptal today in the office.    He was also given a copy of the faxes as well as the confirmation fax to get to the pharmacy
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
## 2023-03-20 NOTE — PATIENT INSTRUCTIONS
As per discussion    We did fax your medications over to [facility] pharmacy and they did send us a faxed certification that they got the a faxed validation that this was received    Nonetheless regarding give you a copy of it so you can take it to the pharmacy I have also given you a hard copy of the Trileptal to get that filled should they still have issues    Regarding your shoulder you will need to see primary care or orthopedics    I continually failed to understand and at a loss why we continue to have so much difficulty with your medications. The Trileptal has been sent in with a years worth of refills so you should not have any issues until 1 year from now. And please make sure that you start working on getting medications refilled about 2 to 4 weeks prior to running out of refills to give us time to continue to address any concerns should we have further delays      Office Policies      Appointments  Please make sure that you arrive for your next appointment at least 15 minutes prior to your appointment time. If for some reason you are going to be late please notify the office to determine if you need to be rescheduled or we can adjust your appointment time      Phone calls/patient messages:  Please allow up to 24 hours for someone in the office to contact you about your call or message. Be mindful your provider may be out of the office or your message may require further review. We encourage you to use [employer] for your messages as this is a faster, more efficient way to communicate with our office    Medication Refills:  Prescription medications require up to 48 business hours to process. We encourage you to use [employer] for your refills. For controlled medications: Please allow up to 72 business hours to process. Certain medications may require you to  a written prescription at our office.     NO narcotic/controlled medications will be prescribed after 4pm Monday through Friday or on weekends    Form/Paperwork Completion:  We ask that you allow 7-14 business days. You may also download your forms to [employer] to have your doctor print off.

## 2023-03-20 NOTE — PROGRESS NOTES
Chief Complaint   Patient presents with    Follow-up     States that he does not know of any seizures since last visit 6 months ago.

## 2023-03-20 NOTE — LETTER
3/20/2023    Patient: Amna Tineo   YOB: 1985   Date of Visit: 3/20/2023     DO Gay Srinivasan III[address]  [address]  [address] [address]  [place]    Dear Satish Weaver DO,      Thank you for referring Mr. Chay Stanley to [facility] for evaluation. My notes for this consultation are attached. If you have questions, please do not hesitate to call me. I look forward to following your patient along with you.       Sincerely,    SHARRI Berg-C

## 2023-08-07 DIAGNOSIS — M79.604 PAIN IN RIGHT LEG: ICD-10-CM

## 2023-08-07 DIAGNOSIS — M79.605 PAIN IN LEFT LEG: Primary | ICD-10-CM

## 2023-08-07 RX ORDER — GABAPENTIN 300 MG/1
300 CAPSULE ORAL 3 TIMES DAILY
Qty: 90 CAPSULE | Refills: 5 | Status: SHIPPED | OUTPATIENT
Start: 2023-08-07 | End: 2024-02-03

## 2023-08-07 RX ORDER — OXCARBAZEPINE 600 MG/1
600 TABLET, FILM COATED ORAL 2 TIMES DAILY
Qty: 60 TABLET | Refills: 11 | Status: SHIPPED | OUTPATIENT
Start: 2023-08-07 | End: 2024-08-06

## 2023-08-07 NOTE — TELEPHONE ENCOUNTER
Oxcarbazepine 600mg. An gabapentin 300mg. You  Alessia Carrasquillo 3 days ago     JM  What prescription are you wanting I need the name of the medication.     Last office visit 3/20/2023  Last med refill  Oxcarbazepine 3/3/2023  Gabapentin 3/3/2023

## 2023-10-25 ENCOUNTER — OFFICE VISIT (OUTPATIENT)
Age: 38
End: 2023-10-25
Payer: MEDICAID

## 2023-10-25 VITALS
RESPIRATION RATE: 17 BRPM | TEMPERATURE: 97.8 F | OXYGEN SATURATION: 96 % | HEART RATE: 95 BPM | SYSTOLIC BLOOD PRESSURE: 122 MMHG | WEIGHT: 265.8 LBS | DIASTOLIC BLOOD PRESSURE: 90 MMHG | HEIGHT: 71 IN | BODY MASS INDEX: 37.21 KG/M2

## 2023-10-25 DIAGNOSIS — M79.605 PAIN IN BOTH LOWER EXTREMITIES: ICD-10-CM

## 2023-10-25 DIAGNOSIS — G40.919 BREAKTHROUGH SEIZURE (HCC): ICD-10-CM

## 2023-10-25 DIAGNOSIS — M79.604 PAIN IN BOTH LOWER EXTREMITIES: ICD-10-CM

## 2023-10-25 DIAGNOSIS — G40.209 COMPLEX PARTIAL SEIZURES WITH CONSCIOUSNESS IMPAIRED (HCC): Primary | ICD-10-CM

## 2023-10-25 PROCEDURE — 99214 OFFICE O/P EST MOD 30 MIN: CPT | Performed by: PSYCHIATRY & NEUROLOGY

## 2023-10-25 NOTE — ASSESSMENT & PLAN NOTE
Patient reports this spreading sometimes including his arms  Continue on gabapentin 300 mg 3 times daily  Recommend patient follow-up with primary care for further evaluation and possible consideration of Paget's but will defer to primary care for evaluation

## 2023-10-25 NOTE — PROGRESS NOTES
[address]  In Office FOLLOW-UP VISIT         Litzy Rich is a 43 y.o. male who presents today for the following:  Chief Complaint   Patient presents with    6 Month Follow-Up     Seizures        ASSESSMENT AND PLAN  1. Complex partial seizures with consciousness impaired (HCC)  Assessment & Plan:   Stable and well-controlled continue on Trileptal 600 mg twice daily  DMV paperwork was filled out at today's office visit  2. Breakthrough seizure [facility])  Assessment & Plan:   Last known seizure-like event was October 2021  No further events  3. Pain in both lower extremities  Assessment & Plan:   Patient reports this spreading sometimes including his arms  Continue on gabapentin 300 mg 3 times daily  Recommend patient follow-up with primary care for further evaluation and possible consideration of Paget's but will defer to primary care for evaluation        Patient and/or family was given time to ask questions and voice concerns. I believe all questions concerns were adequately addressed at this  office visit. Patient and/or family also verbalized agreement and understanding of the above-stated plan    Complex neurologic decision making secondary any or all of the following to include unclear etiology, and /or polypharmacy, and/or significant comorbid conditions, and/or use of high-risk medications which complicate the decision making process related to patient's neurologic diagnosis    Return in about 6 months (around 4/25/2024) for In office. ICD-10-CM    1. Complex partial seizures with consciousness impaired ([address])  G40.209       2. Breakthrough seizure ([address])  G40.919       3.  Pain in both lower extremities  M79.604     M79.605                 HPI  Historical Data  Patient is known to the practice  We follow him for seizure disorder  Last known seizure was 2018 due to a low therapeutic Trileptal level  EEG in 2018 was normal             Previously had left temporal

## 2023-10-25 NOTE — PATIENT INSTRUCTIONS
As per discussion  Continue on the Trileptal  I sent a prescription in August for a month supply but refills for an entire year so he should have no problem getting a prescription    DMV paperwork has been filled out and sent to the [facility] and we have given you a hard copy of that should not be any issues you can bring it directly to them    Regarding the chronic deep bone pain you may want to follow-up with primary care for further evaluation  And possibly consider Paget's disease but I will defer to primary care regarding need for work-up  Continue on the gabapentin for now  The gabapentin was also renewed in August with a total of 6 months worth of refills because that is much as I am allowed to refill it 1 time so your pharmacy will need to send us a refill request when that is due again        Office Policies    Phone calls/patient messages:  Please allow up to 24 hours for someone in the office to contact you about your call or message. Be mindful your provider may be out of the office or your message may require further review. We encourage you to use [employer] for your messages as this is a faster, more efficient way to communicate with our office    Medication Refills:  Prescription medications require up to 48 business hours to process. We encourage you to use [employer] for your refills. For controlled medications: Please allow up to 72 business hours to process. Certain medications may require you to  a written prescription at our office. NO narcotic/controlled medications will be prescribed after 4pm Monday through Friday or on weekends    Form/Paperwork Completion:  We ask that you allow 7-14 business days. You may also download your forms to [employer] to have your doctor print off.

## 2023-10-25 NOTE — ASSESSMENT & PLAN NOTE
Stable and well-controlled continue on Trileptal 600 mg twice daily  DMV paperwork was filled out at today's office visit

## 2023-12-07 DIAGNOSIS — M79.604 PAIN IN RIGHT LEG: ICD-10-CM

## 2023-12-07 DIAGNOSIS — M79.605 PAIN IN LEFT LEG: ICD-10-CM

## 2023-12-08 RX ORDER — GABAPENTIN 300 MG/1
300 CAPSULE ORAL 3 TIMES DAILY
Qty: 90 CAPSULE | Refills: 5 | OUTPATIENT
Start: 2023-12-08 | End: 2024-06-05

## 2023-12-08 NOTE — TELEPHONE ENCOUNTER
Last office visit 10/25/2023  Last med refill 08/07/2023 1 month supply with 5 refills     Too soon for next refill
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
## 2024-03-07 DIAGNOSIS — M79.605 PAIN IN LEFT LEG: ICD-10-CM

## 2024-03-07 DIAGNOSIS — M79.604 PAIN IN RIGHT LEG: ICD-10-CM

## 2024-03-08 RX ORDER — GABAPENTIN 300 MG/1
300 CAPSULE ORAL 3 TIMES DAILY
Qty: 90 CAPSULE | Refills: 5 | Status: SHIPPED | OUTPATIENT
Start: 2024-03-08 | End: 2024-09-04

## 2024-03-08 RX ORDER — GABAPENTIN 300 MG/1
CAPSULE ORAL
Qty: 90 CAPSULE | OUTPATIENT
Start: 2024-03-08

## 2024-03-08 NOTE — TELEPHONE ENCOUNTER
Patient needs to have a follow-up appointment with me October 2024 I do not see any appointments on the books please place him on the schedule for routine follow-up

## 2024-06-27 ENCOUNTER — TELEPHONE (OUTPATIENT)
Age: 39
End: 2024-06-27

## 2024-08-06 DIAGNOSIS — M79.604 PAIN IN RIGHT LEG: ICD-10-CM

## 2024-08-06 DIAGNOSIS — M79.605 PAIN IN LEFT LEG: ICD-10-CM

## 2024-08-06 RX ORDER — OXCARBAZEPINE 600 MG/1
600 TABLET, FILM COATED ORAL 2 TIMES DAILY
Qty: 60 TABLET | Refills: 11 | Status: SHIPPED | OUTPATIENT
Start: 2024-08-06 | End: 2025-08-06

## 2024-08-06 RX ORDER — GABAPENTIN 300 MG/1
300 CAPSULE ORAL 3 TIMES DAILY
Qty: 270 CAPSULE | Refills: 1 | Status: SHIPPED | OUTPATIENT
Start: 2024-08-06 | End: 2025-02-02

## 2024-08-06 NOTE — TELEPHONE ENCOUNTER
Last office visit 10/25/2023  Next office visit 8/15/2024  Last med refill     Gabapentin 3/8/2024  Oxcarbazepine 8/7/2023

## 2024-08-15 ENCOUNTER — OFFICE VISIT (OUTPATIENT)
Age: 39
End: 2024-08-15
Payer: MEDICAID

## 2024-08-15 VITALS
SYSTOLIC BLOOD PRESSURE: 110 MMHG | WEIGHT: 282.4 LBS | RESPIRATION RATE: 16 BRPM | BODY MASS INDEX: 39.53 KG/M2 | TEMPERATURE: 97.5 F | HEART RATE: 92 BPM | DIASTOLIC BLOOD PRESSURE: 82 MMHG | OXYGEN SATURATION: 93 % | HEIGHT: 71 IN

## 2024-08-15 DIAGNOSIS — M79.604 PAIN IN BOTH LOWER EXTREMITIES: ICD-10-CM

## 2024-08-15 DIAGNOSIS — M79.605 PAIN IN BOTH LOWER EXTREMITIES: ICD-10-CM

## 2024-08-15 DIAGNOSIS — G40.919 BREAKTHROUGH SEIZURE (HCC): Primary | ICD-10-CM

## 2024-08-15 PROCEDURE — 99214 OFFICE O/P EST MOD 30 MIN: CPT | Performed by: PSYCHIATRY & NEUROLOGY

## 2024-08-15 RX ORDER — OXCARBAZEPINE 600 MG/1
TABLET, FILM COATED ORAL
Qty: 270 TABLET | Refills: 3 | Status: SHIPPED | OUTPATIENT
Start: 2024-08-15

## 2024-08-15 ASSESSMENT — PATIENT HEALTH QUESTIONNAIRE - PHQ9
SUM OF ALL RESPONSES TO PHQ QUESTIONS 1-9: 0
2. FEELING DOWN, DEPRESSED OR HOPELESS: NOT AT ALL
SUM OF ALL RESPONSES TO PHQ9 QUESTIONS 1 & 2: 0
SUM OF ALL RESPONSES TO PHQ QUESTIONS 1-9: 0

## 2024-08-15 NOTE — ASSESSMENT & PLAN NOTE
Patient has been advised of [employer] regulations at today's office visit that he is not to drive for 6 months.     It is possible that his pharmacy changed generic formulation which may have triggered the seizures.  I have asked him to investigate that.  But for right now we will increase his Trileptal to a total of 1800 mg/day previously he was on 1200 mg/day  He is to continue on the 600 mg tablet taking 1 in the morning and 2 in the evening    Will check MRI of the brain to make sure there is no new pathology and 24-hour EEG.    Also check blood work to make sure there is no contributing factors to recent breakthrough seizures

## 2024-08-15 NOTE — PATIENT INSTRUCTIONS
As a reminder:   Please come to your appointment 15 minutes before your office appointment.  This way, you can get checked in at the  and checked in by the nursing staff so you have the full allotment of time with your provider for your visit.  Please bring an up-to-date and accurate list of all your medications.  Or bring all your active prescription bottles with you at the time of your office visit and this includes over-the-counter medications so we can make sure that your medication list is up-to-date.  If you are scheduled for a virtual visit, please be aware that the  will need to check you in and usually the day before to verify insurance and collect co-pays as appropriate.  Please be prepared for the second call which will be from the nurse to go over your medications and any other vital information.  This will probably be done 30 minutes prior to your visit.  The reason why we do this early is that you can get the full benefit of your appointment time with your provider.  Finally you will be given the link for your virtual visit please click into your link 10 minutes prior to your appointment and please wait patiently for the provider to join you        As per discussion  You have been informed of the [employer] regulations with no driving for 6 months.    Increase the Trileptal taking 1 in the morning and 2 in the evening have sent in a new prescription for your pharmacy.  But double check with your pharmacy to make sure they did not change generic formulations if they did please let me know the name of the prior generic and we will send in a new prescription for that formulation only.    I have ordered some blood work please get that completed and we will check an EEG and an MRI scan to make sure there are no major changes    You do need to get a primary care doctor please review the handout on primary care providers in the [facility] and pick somebody close to you to get

## 2024-08-15 NOTE — ASSESSMENT & PLAN NOTE
For now continue on gabapentin 300 mg 3 times a day    I have asked the patient to get a primary care provider to follow this long-term and to address the issues of swelling in his legs and any other medical conditions

## 2024-08-16 DIAGNOSIS — G40.919 BREAKTHROUGH SEIZURE (HCC): ICD-10-CM

## 2024-08-16 LAB
ALBUMIN SERPL-MCNC: 3.5 G/DL (ref 3.5–5)
ALBUMIN/GLOB SERPL: 1.1 (ref 1.1–2.2)
ALP SERPL-CCNC: 100 U/L (ref 45–117)
ALT SERPL-CCNC: 31 U/L (ref 12–78)
ANION GAP SERPL CALC-SCNC: 7 MMOL/L (ref 5–15)
AST SERPL-CCNC: 19 U/L (ref 15–37)
BASOPHILS # BLD: 0.1 K/UL (ref 0–0.1)
BASOPHILS NFR BLD: 1 % (ref 0–1)
BILIRUB SERPL-MCNC: 0.4 MG/DL (ref 0.2–1)
BUN SERPL-MCNC: 12 MG/DL (ref 6–20)
BUN/CREAT SERPL: 12 (ref 12–20)
CALCIUM SERPL-MCNC: 9.2 MG/DL (ref 8.5–10.1)
CHLORIDE SERPL-SCNC: 107 MMOL/L (ref 97–108)
CO2 SERPL-SCNC: 26 MMOL/L (ref 21–32)
CREAT SERPL-MCNC: 1.03 MG/DL (ref 0.7–1.3)
DIFFERENTIAL METHOD BLD: ABNORMAL
EOSINOPHIL # BLD: 0.2 K/UL (ref 0–0.4)
EOSINOPHIL NFR BLD: 3 % (ref 0–7)
ERYTHROCYTE [DISTWIDTH] IN BLOOD BY AUTOMATED COUNT: 13.2 % (ref 11.5–14.5)
GLOBULIN SER CALC-MCNC: 3.3 G/DL (ref 2–4)
GLUCOSE SERPL-MCNC: 110 MG/DL (ref 65–100)
HCT VFR BLD AUTO: 44.3 % (ref 36.6–50.3)
HGB BLD-MCNC: 15.1 G/DL (ref 12.1–17)
IMM GRANULOCYTES # BLD AUTO: 0.2 K/UL (ref 0–0.04)
IMM GRANULOCYTES NFR BLD AUTO: 3 % (ref 0–0.5)
LYMPHOCYTES # BLD: 2.4 K/UL (ref 0.8–3.5)
LYMPHOCYTES NFR BLD: 35 % (ref 12–49)
MCH RBC QN AUTO: 31.6 PG (ref 26–34)
MCHC RBC AUTO-ENTMCNC: 34.1 G/DL (ref 30–36.5)
MCV RBC AUTO: 92.7 FL (ref 80–99)
MONOCYTES # BLD: 0.5 K/UL (ref 0–1)
MONOCYTES NFR BLD: 8 % (ref 5–13)
NEUTS SEG # BLD: 3.4 K/UL (ref 1.8–8)
NEUTS SEG NFR BLD: 50 % (ref 32–75)
NRBC # BLD: 0 K/UL (ref 0–0.01)
NRBC BLD-RTO: 0 PER 100 WBC
PLATELET # BLD AUTO: 252 K/UL (ref 150–400)
PMV BLD AUTO: 11.8 FL (ref 8.9–12.9)
POTASSIUM SERPL-SCNC: 4.2 MMOL/L (ref 3.5–5.1)
PROT SERPL-MCNC: 6.8 G/DL (ref 6.4–8.2)
RBC # BLD AUTO: 4.78 M/UL (ref 4.1–5.7)
RBC MORPH BLD: ABNORMAL
SODIUM SERPL-SCNC: 140 MMOL/L (ref 136–145)
TSH SERPL DL<=0.05 MIU/L-ACNC: 1.67 UIU/ML (ref 0.36–3.74)
WBC # BLD AUTO: 6.8 K/UL (ref 4.1–11.1)

## 2024-08-20 LAB — OXCARBAZEPINE SERPL-MCNC: 14 UG/ML (ref 10–35)

## 2024-08-27 ENCOUNTER — HOSPITAL ENCOUNTER (OUTPATIENT)
Facility: HOSPITAL | Age: 39
Discharge: HOME | End: 2024-08-30
Payer: MEDICAID

## 2024-08-27 DIAGNOSIS — G40.919 BREAKTHROUGH SEIZURE (HCC): ICD-10-CM

## 2024-08-27 PROCEDURE — 70553 MRI BRAIN STEM W/O & W/DYE: CPT

## 2024-08-27 PROCEDURE — 6360000004 HC RX CONTRAST MEDICATION: Performed by: RADIOLOGY

## 2024-08-27 PROCEDURE — A9579 GAD-BASE MR CONTRAST NOS,1ML: HCPCS | Performed by: RADIOLOGY

## 2024-08-27 RX ADMIN — GADOTERIDOL 20 ML: 279.3 INJECTION, SOLUTION INTRAVENOUS at 10:59

## 2024-10-03 ENCOUNTER — HOSPITAL ENCOUNTER (OUTPATIENT)
Facility: HOSPITAL | Age: 39
Discharge: HOME | End: 2024-10-06
Payer: MEDICAID

## 2024-10-03 DIAGNOSIS — G40.919 BREAKTHROUGH SEIZURE (HCC): ICD-10-CM

## 2024-10-03 PROCEDURE — 95708 EEG WO VID EA 12-26HR UNMNTR: CPT

## 2024-10-04 NOTE — PROGRESS NOTES
Patient was due to come back 10/4/2024 10 am to have 24hr EEG removed. He has not showed up and we are unable to reach him or leave voice mail.

## 2024-10-09 NOTE — PROCEDURES
[facility]              [address] [place] [address]                                   EEG      PATIENT NAME: OMI HANKS           : 1985  MED REC NO: [id number]                       ROOM:   ACCOUNT NO: [id number]                       ADMIT DATE: 10/03/2024  PROVIDER: Alli Jurado MD    DATE OF SERVICE:  10/03/2024    REFERRING PHYSICIAN:  JAVED ORELLANA    24-HOUR AMBULATORY EEG RECORDING.    DATE OF SERVICE:  10/03/2024 to 10/04/2024.    CLINICAL INDICATION:  The patient is a 39-year-old male with a history of seizures, on medication of Trileptal.  The patient is for EEG to rule out recurring seizures or breakthrough seizures.    EEG CLASSIFICATION:  This patient's EEG classification is Dysrhythmia, grade 3, left hemisphere with secondary generalized spike and polyspike wave discharges and generalized seizures.    DESCRIPTION OF THE RECORD:  This is a 16-channel EEG recording on the patient to evaluate for possible recurrent breakthrough seizures.  This study does show in the recording several recorded spells, perhaps 4 to 6 spells where the patient seems to have a spike and wave discharges that seemed to begin in the left hemisphere, more in the posterior temporal occipital region in the left hemisphere and then spread to the left hemisphere in generalized fashion and several times spread into a generalized seizure involving both hemispheres that lasted several minutes with a generalized seizure and sometimes just stayed in the left temporal area, which just lasts about a minute.  The patient has considerable movement muscle and electrode artifact at times seen in the recording and from about 7 in the morning onward, I think the EEG wires were dislodged.  The study began on 10/03/2024 at approximately 9:30 a.m. and ended on 10/04/2024 at approximately 10:30 a.m. for a total time of study about 25 hours.  During this study, the patient was

## 2025-08-18 DIAGNOSIS — M79.604 PAIN IN RIGHT LEG: ICD-10-CM

## 2025-08-18 DIAGNOSIS — M79.605 PAIN IN LEFT LEG: ICD-10-CM

## 2025-08-21 RX ORDER — GABAPENTIN 300 MG/1
CAPSULE ORAL
Qty: 270 CAPSULE | Refills: 0 | Status: SHIPPED | OUTPATIENT
Start: 2025-08-21 | End: 2025-11-19